# Patient Record
Sex: FEMALE | Race: WHITE | Employment: UNEMPLOYED | URBAN - METROPOLITAN AREA
[De-identification: names, ages, dates, MRNs, and addresses within clinical notes are randomized per-mention and may not be internally consistent; named-entity substitution may affect disease eponyms.]

---

## 2017-06-12 ENCOUNTER — OFFICE VISIT (OUTPATIENT)
Dept: INTERNAL MEDICINE | Age: 22
End: 2017-06-12

## 2017-06-12 VITALS
DIASTOLIC BLOOD PRESSURE: 84 MMHG | SYSTOLIC BLOOD PRESSURE: 124 MMHG | HEIGHT: 65 IN | HEART RATE: 83 BPM | WEIGHT: 256 LBS | OXYGEN SATURATION: 99 % | TEMPERATURE: 98 F | BODY MASS INDEX: 42.65 KG/M2

## 2017-06-12 DIAGNOSIS — Z11.4 SCREENING FOR HIV WITHOUT PRESENCE OF RISK FACTORS: ICD-10-CM

## 2017-06-12 DIAGNOSIS — L40.9 PSORIASIS: ICD-10-CM

## 2017-06-12 DIAGNOSIS — Z00.00 ROUTINE GENERAL MEDICAL EXAMINATION AT A HEALTH CARE FACILITY: ICD-10-CM

## 2017-06-12 DIAGNOSIS — Z00.00 ROUTINE GENERAL MEDICAL EXAMINATION AT A HEALTH CARE FACILITY: Primary | ICD-10-CM

## 2017-06-12 DIAGNOSIS — L01.00 IMPETIGO: ICD-10-CM

## 2017-06-12 LAB
A/G RATIO: 1.5 (ref 1.1–2.2)
ALBUMIN SERPL-MCNC: 4 G/DL (ref 3.4–5)
ALP BLD-CCNC: 48 U/L (ref 40–129)
ALT SERPL-CCNC: 8 U/L (ref 10–40)
ANION GAP SERPL CALCULATED.3IONS-SCNC: 14 MMOL/L (ref 3–16)
AST SERPL-CCNC: 8 U/L (ref 15–37)
BILIRUB SERPL-MCNC: 0.3 MG/DL (ref 0–1)
BUN BLDV-MCNC: 13 MG/DL (ref 7–20)
CALCIUM SERPL-MCNC: 9.1 MG/DL (ref 8.3–10.6)
CHLORIDE BLD-SCNC: 104 MMOL/L (ref 99–110)
CHOLESTEROL, TOTAL: 171 MG/DL (ref 0–199)
CO2: 23 MMOL/L (ref 21–32)
CREAT SERPL-MCNC: 0.7 MG/DL (ref 0.6–1.1)
GFR AFRICAN AMERICAN: >60
GFR NON-AFRICAN AMERICAN: >60
GLOBULIN: 2.6 G/DL
GLUCOSE FASTING: 106 MG/DL (ref 70–99)
HCT VFR BLD CALC: 39.5 % (ref 36–48)
HDLC SERPL-MCNC: 55 MG/DL (ref 40–60)
HEMOGLOBIN: 12.7 G/DL (ref 12–16)
HEPATITIS C ANTIBODY INTERPRETATION: NORMAL
LDL CHOLESTEROL CALCULATED: 83 MG/DL
MCH RBC QN AUTO: 28.1 PG (ref 26–34)
MCHC RBC AUTO-ENTMCNC: 32.3 G/DL (ref 31–36)
MCV RBC AUTO: 87.1 FL (ref 80–100)
PDW BLD-RTO: 14.2 % (ref 12.4–15.4)
PLATELET # BLD: 271 K/UL (ref 135–450)
PMV BLD AUTO: 8.7 FL (ref 5–10.5)
POTASSIUM SERPL-SCNC: 4 MMOL/L (ref 3.5–5.1)
RBC # BLD: 4.53 M/UL (ref 4–5.2)
SODIUM BLD-SCNC: 141 MMOL/L (ref 136–145)
T4 FREE: 1.3 NG/DL (ref 0.9–1.8)
TOTAL PROTEIN: 6.6 G/DL (ref 6.4–8.2)
TRIGL SERPL-MCNC: 165 MG/DL (ref 0–150)
TSH REFLEX: 0.99 UIU/ML (ref 0.27–4.2)
VLDLC SERPL CALC-MCNC: 33 MG/DL
WBC # BLD: 7.3 K/UL (ref 4–11)

## 2017-06-12 PROCEDURE — 99202 OFFICE O/P NEW SF 15 MIN: CPT | Performed by: NURSE PRACTITIONER

## 2017-06-12 PROCEDURE — 99385 PREV VISIT NEW AGE 18-39: CPT | Performed by: NURSE PRACTITIONER

## 2017-06-12 RX ORDER — DICLOXACILLIN SODIUM 250 MG/1
250 CAPSULE ORAL 4 TIMES DAILY
Qty: 28 CAPSULE | Refills: 0 | Status: SHIPPED | OUTPATIENT
Start: 2017-06-12 | End: 2017-06-19

## 2017-06-12 RX ORDER — BETAMETHASONE DIPROPIONATE 0.05 %
OINTMENT (GRAM) TOPICAL
Qty: 15 G | Refills: 0 | Status: SHIPPED | OUTPATIENT
Start: 2017-06-12 | End: 2018-06-04 | Stop reason: ALTCHOICE

## 2017-06-12 ASSESSMENT — ENCOUNTER SYMPTOMS
ABDOMINAL PAIN: 0
SHORTNESS OF BREATH: 0
BLOOD IN STOOL: 0
HEARTBURN: 0
SPUTUM PRODUCTION: 0
WHEEZING: 0
EYE PAIN: 0
VOMITING: 0
COUGH: 0
HEMOPTYSIS: 0
PHOTOPHOBIA: 0
BACK PAIN: 0
EYE DISCHARGE: 0
DIARRHEA: 0
EYE REDNESS: 0
SORE THROAT: 0
CONSTIPATION: 0
BLURRED VISION: 0
STRIDOR: 0
NAUSEA: 0
DOUBLE VISION: 0
ORTHOPNEA: 0

## 2017-06-13 DIAGNOSIS — Z00.00 ROUTINE GENERAL MEDICAL EXAMINATION AT A HEALTH CARE FACILITY: Primary | ICD-10-CM

## 2017-06-13 DIAGNOSIS — R73.9 ELEVATED BLOOD SUGAR: ICD-10-CM

## 2017-06-13 LAB
ESTIMATED AVERAGE GLUCOSE: 99.7 MG/DL
HBA1C MFR BLD: 5.1 %
HIV-1 AND HIV-2 ANTIBODIES: NORMAL

## 2017-08-18 ENCOUNTER — OFFICE VISIT (OUTPATIENT)
Dept: INTERNAL MEDICINE | Age: 22
End: 2017-08-18

## 2017-08-18 VITALS
HEART RATE: 94 BPM | DIASTOLIC BLOOD PRESSURE: 88 MMHG | WEIGHT: 252 LBS | OXYGEN SATURATION: 98 % | SYSTOLIC BLOOD PRESSURE: 128 MMHG | BODY MASS INDEX: 41.99 KG/M2 | TEMPERATURE: 97.4 F | HEIGHT: 65 IN

## 2017-08-18 DIAGNOSIS — L30.9 DERMATITIS: Primary | ICD-10-CM

## 2017-08-18 PROCEDURE — 99213 OFFICE O/P EST LOW 20 MIN: CPT | Performed by: NURSE PRACTITIONER

## 2017-08-18 RX ORDER — METHYLPREDNISOLONE 4 MG/1
TABLET ORAL
Qty: 1 KIT | Refills: 0 | Status: SHIPPED | OUTPATIENT
Start: 2017-08-18 | End: 2017-08-24

## 2017-08-18 ASSESSMENT — ENCOUNTER SYMPTOMS
COUGH: 0
SHORTNESS OF BREATH: 0

## 2017-08-29 ENCOUNTER — TELEPHONE (OUTPATIENT)
Dept: INTERNAL MEDICINE | Age: 22
End: 2017-08-29

## 2017-08-29 DIAGNOSIS — L30.9 DERMATITIS: Primary | ICD-10-CM

## 2017-08-29 RX ORDER — PREDNISONE 20 MG/1
TABLET ORAL
Qty: 21 TABLET | Refills: 0 | Status: SHIPPED | OUTPATIENT
Start: 2017-08-29 | End: 2018-05-24 | Stop reason: ALTCHOICE

## 2017-09-15 DIAGNOSIS — L30.9 DERMATITIS: ICD-10-CM

## 2017-09-15 RX ORDER — PREDNISONE 20 MG/1
TABLET ORAL
Qty: 21 TABLET | Refills: 0 | OUTPATIENT
Start: 2017-09-15

## 2017-10-30 ENCOUNTER — OFFICE VISIT (OUTPATIENT)
Dept: DERMATOLOGY | Age: 22
End: 2017-10-30

## 2017-10-30 DIAGNOSIS — L30.9 ECZEMA, UNSPECIFIED TYPE: Primary | ICD-10-CM

## 2017-10-30 DIAGNOSIS — Z78.9 NON-TOBACCO USER: ICD-10-CM

## 2017-10-30 PROCEDURE — 99202 OFFICE O/P NEW SF 15 MIN: CPT | Performed by: DERMATOLOGY

## 2017-10-30 NOTE — PROGRESS NOTES
[facility]) Dermatology  [place]luvia [place], [facility] [address]       Rahel Mueller  1995    24 y.o. female     Date of Visit: 10/30/2017    Chief Complaint:   Chief Complaint   Patient presents with    New Patient    Eczema     on face, hands, arms and stomach- was on a steroid now on augmented 0.05%        I was asked to see this patient by Dr. Monserrat Sanders. History of Present Illness:  Rahel Mueller is a 24 y.o. female who presents with the chief complaint of establish care and itchy rash located on hands/arms, stomach, and face. She has had intermittent flares of eczema for several months. She was prescribed topical betamethasone 0.05% ointment and has used inconsistently for weeks. She was given short course of oral prednisone taper that did help to resolve rash but came back once completed course of prednisone. She states her face is mostly clear today. Review of Systems:  Constitutional: Reports general sense of well-being   Skin: No new or changing moles, no history of keloids or hypertrophic scars. Heme: No abnormal bruising or bleeding. Past Medical History, Family History, Surgical History, Medications and Allergies reviewed. Past Skin Hx:  Hx of impetigo. PMHx: denies hx of childhood eczema, seasonal allergies, or asthma      Family History   Problem Relation Age of Onset    Diabetes Father      Past Medical History:   Diagnosis Date    Impetigo      Past Surgical History:   Procedure Laterality Date    DILATION AND CURETTAGE OF UTERUS      DILATION AND CURETTAGE OF UTERUS  2012       No Known Allergies  Outpatient Prescriptions Marked as Taking for the 10/30/17 encounter (Office Visit) with Lluvia Phoenix, DO   Medication Sig Dispense Refill    betamethasone dipropionate (DIPROLENE) 0.05 % ointment Apply topically daily.  15 g 0       Social History:   Social History     Social History    Marital status: Single     Spouse name: N/A    Number of children:

## 2018-05-08 ENCOUNTER — TELEPHONE (OUTPATIENT)
Dept: DERMATOLOGY | Age: 23
End: 2018-05-08

## 2018-05-25 ENCOUNTER — TELEPHONE (OUTPATIENT)
Dept: DERMATOLOGY | Age: 23
End: 2018-05-25

## 2018-06-04 ENCOUNTER — OFFICE VISIT (OUTPATIENT)
Dept: DERMATOLOGY | Age: 23
End: 2018-06-04

## 2018-06-04 DIAGNOSIS — L30.9 LIP-LICKING ECZEMA: ICD-10-CM

## 2018-06-04 DIAGNOSIS — L30.8 OTHER ECZEMA: Primary | ICD-10-CM

## 2018-06-04 PROCEDURE — 1036F TOBACCO NON-USER: CPT | Performed by: DERMATOLOGY

## 2018-06-04 PROCEDURE — G8427 DOCREV CUR MEDS BY ELIG CLIN: HCPCS | Performed by: DERMATOLOGY

## 2018-06-04 PROCEDURE — G8417 CALC BMI ABV UP PARAM F/U: HCPCS | Performed by: DERMATOLOGY

## 2018-06-04 PROCEDURE — 99213 OFFICE O/P EST LOW 20 MIN: CPT | Performed by: DERMATOLOGY

## 2018-06-04 RX ORDER — ACETAMINOPHEN 160 MG
TABLET,DISINTEGRATING ORAL
Status: ON HOLD | COMMUNITY
Start: 2018-06-03 | End: 2020-09-24

## 2018-06-04 RX ORDER — DESONIDE 0.5 MG/G
OINTMENT TOPICAL
Qty: 60 G | Refills: 0 | Status: ON HOLD | OUTPATIENT
Start: 2018-06-04 | End: 2020-08-07

## 2018-06-04 RX ORDER — FEXOFENADINE HCL 180 MG/1
180 TABLET ORAL
COMMUNITY
Start: 2018-05-08 | End: 2018-08-06

## 2018-06-04 RX ORDER — MOMETASONE FUROATE 1 MG/G
OINTMENT TOPICAL
Refills: 0 | Status: ON HOLD | COMMUNITY
Start: 2018-05-08 | End: 2020-08-07

## 2018-06-18 ENCOUNTER — OFFICE VISIT (OUTPATIENT)
Dept: DERMATOLOGY | Age: 23
End: 2018-06-18

## 2018-06-18 DIAGNOSIS — H01.119 EYELID DERMATITIS, ALLERGIC/CONTACT: Primary | ICD-10-CM

## 2018-06-18 DIAGNOSIS — L30.9 LIP-LICKING ECZEMA: ICD-10-CM

## 2018-06-18 PROCEDURE — 1036F TOBACCO NON-USER: CPT | Performed by: DERMATOLOGY

## 2018-06-18 PROCEDURE — 99213 OFFICE O/P EST LOW 20 MIN: CPT | Performed by: DERMATOLOGY

## 2018-06-18 PROCEDURE — G8417 CALC BMI ABV UP PARAM F/U: HCPCS | Performed by: DERMATOLOGY

## 2018-06-18 PROCEDURE — G8427 DOCREV CUR MEDS BY ELIG CLIN: HCPCS | Performed by: DERMATOLOGY

## 2018-06-18 RX ORDER — TACROLIMUS 1 MG/G
OINTMENT TOPICAL
Qty: 30 G | Refills: 1 | Status: ON HOLD | OUTPATIENT
Start: 2018-06-18 | End: 2020-08-07

## 2020-03-04 LAB
C. TRACHOMATIS, EXTERNAL RESULT: NEGATIVE
N. GONORRHOEAE, EXTERNAL RESULT: NEGATIVE

## 2020-03-16 LAB
ABO, EXTERNAL RESULT: NORMAL
HEP B, EXTERNAL RESULT: NEGATIVE
HIV, EXTERNAL RESULT: NORMAL
RH FACTOR, EXTERNAL RESULT: POSITIVE
RPR, EXTERNAL RESULT: NORMAL
RUBELLA TITER, EXTERNAL RESULT: NORMAL

## 2020-08-07 ENCOUNTER — HOSPITAL ENCOUNTER (OUTPATIENT)
Age: 25
Discharge: HOME HEALTH | End: 2020-08-07
Attending: OBSTETRICS & GYNECOLOGY | Admitting: OBSTETRICS & GYNECOLOGY
Payer: COMMERCIAL

## 2020-08-07 VITALS
SYSTOLIC BLOOD PRESSURE: 129 MMHG | WEIGHT: 230 LBS | HEART RATE: 87 BPM | HEIGHT: 65 IN | TEMPERATURE: 98.4 F | BODY MASS INDEX: 38.32 KG/M2 | DIASTOLIC BLOOD PRESSURE: 79 MMHG | RESPIRATION RATE: 16 BRPM

## 2020-08-07 PROCEDURE — 99234 HOSP IP/OBS SM DT SF/LOW 45: CPT | Performed by: OBSTETRICS & GYNECOLOGY

## 2020-08-07 PROCEDURE — 99211 OFF/OP EST MAY X REQ PHY/QHP: CPT

## 2020-08-07 RX ORDER — ONDANSETRON 4 MG/1
4 TABLET, FILM COATED ORAL EVERY 8 HOURS PRN
Status: ON HOLD | COMMUNITY
End: 2020-09-23

## 2020-08-07 ASSESSMENT — ENCOUNTER SYMPTOMS
VOMITING: 0
DIARRHEA: 0
ABDOMINAL DISTENTION: 0
NAUSEA: 0
CONSTIPATION: 0
SHORTNESS OF BREATH: 0
ABDOMINAL PAIN: 0

## 2020-08-08 NOTE — PROGRESS NOTES
Pt declines need for any medications for current headache and is comfortable with plan to discharge with follow up at scheduled appointment on Monday.

## 2020-08-08 NOTE — H&P
Department of Obstetrics and Gynecology  Labor and Delivery  Attending Triage Note      SUBJECTIVE:  20 y/o [id number] female at 34 weeks 4 days gestation with [facility] 10/19/2020 presents to triage for evaluation secondary to persistent headache. Has noted headache throughout the day and became concerned when symptoms did not improve with hydration, Tylenol and caffeine. Admits to occasional flashes in vision but nothing consistent. Denies RUQ pain. Denies vaginal bleeding, loss of fluid, contractions, and decreased fetal movement. Pregnancy is complicated by latex allergy, maternal weight, and history of SAB x 2 with D&C. Review of Systems   Constitutional: Negative for activity change, appetite change, chills, fatigue, fever and unexpected weight change. Eyes: Negative for visual disturbance. Respiratory: Negative for shortness of breath. Cardiovascular: Negative for chest pain. Gastrointestinal: Negative for abdominal distention, abdominal pain, constipation, diarrhea, nausea and vomiting. Genitourinary: Negative for difficulty urinating, dysuria, hematuria, pelvic pain, vaginal bleeding, vaginal discharge and vaginal pain. Neurological: Positive for headaches. Allergies   Allergen Reactions    Latex Rash    Lactose Intolerance (Gi) Diarrhea     . No current facility-administered medications on file prior to encounter.       Current Outpatient Medications on File Prior to Encounter   Medication Sig Dispense Refill    Prenatal MV-Min-Fe Fum-FA-DHA (PRENATAL 1 PO) Take by mouth      ondansetron (ZOFRAN) 4 MG tablet Take 4 mg by mouth every 8 hours as needed for Nausea or Vomiting      Cholecalciferol (VITAMIN D3) 2000 units CAPS        Past Medical History:   Diagnosis Date    Impetigo      Past Surgical History:   Procedure Laterality Date    DILATION AND CURETTAGE OF UTERUS      DILATION AND CURETTAGE OF UTERUS  2012     Social History     Socioeconomic History    Marital status: Single     Spouse name: Not on file    Number of children: Not on file    Years of education: Not on file    Highest education level: Not on file   Occupational History    Not on file   Social Needs    Financial resource strain: Not on file    Food insecurity     Worry: Not on file     Inability: Not on file    Transportation needs     Medical: Not on file     Non-medical: Not on file   Tobacco Use    Smoking status: Never Smoker    Smokeless tobacco: Never Used   Substance and Sexual Activity    Alcohol use: Yes     Comment: occasionally    Drug use: No    Sexual activity: Yes     Partners: Male   Lifestyle    Physical activity     Days per week: Not on file     Minutes per session: Not on file    Stress: Not on file   Relationships    Social connections     Talks on phone: Not on file     Gets together: Not on file     Attends Rastafari service: Not on file     Active member of club or organization: Not on file     Attends meetings of clubs or organizations: Not on file     Relationship status: Not on file    Intimate partner violence     Fear of current or ex partner: Not on file     Emotionally abused: Not on file     Physically abused: Not on file     Forced sexual activity: Not on file   Other Topics Concern    Not on file   Social History Narrative    Not on file     Family History   Problem Relation Age of Onset    Diabetes Father      OB History    Para Term  AB Living   4 1 1   2 1   SAB TAB Ectopic Molar Multiple Live Births   2         1      # Outcome Date GA Lbr Cas/2nd Weight Sex Delivery Anes PTL Lv   4 Current            3 2019           2 Term 11/03/15    M Vag-Spont   [facility]   1 2012               OBJECTIVE    Vitals:  /79   Pulse 87   Temp 98.4 °F (36.9 °C) (Oral)   Resp 16   Ht 5' 5\" (1.651 m)   LMP  (Exact Date)   BMI 39.94 kg/m²   Blood pressures: 120's-130's/70's    CONSTITUTIONAL:  awake, alert, cooperative, no apparent distress, and

## 2020-08-08 NOTE — PROGRESS NOTES
Pt presents to triage with c/o pelvic pain intermittently over past week, and headache starting yesterday 6-7/10. +FM, denies LOF or abnormal vaginal discharge. Pt took tylenol for headache at 1915 with little relief. Placed on EFM at this time.

## 2020-08-08 NOTE — PROGRESS NOTES
Pt verbalized understanding of verbal and written discharge instructions and denies having questions at this time. Pt left OB unit at 2233 ambulatory, undelivered, and in stable condition, accompanied by FOB. Patient is not in active labor.

## 2020-08-08 NOTE — PROGRESS NOTES
Updated Dr. Garfield Serra of [facility] BP checks, c/o headache 6-7/10 not relived by tylenol at [address], no current pelvic pain. If pt desires medication for pain okay to order meds, otherwise okay to discharge pt after evaluation from  and instructions to follow up at regularly scheduled appointment.

## 2020-09-11 ENCOUNTER — HOSPITAL ENCOUNTER (OUTPATIENT)
Age: 25
Discharge: HOME | End: 2020-09-11
Attending: OBSTETRICS & GYNECOLOGY | Admitting: OBSTETRICS & GYNECOLOGY
Payer: COMMERCIAL

## 2020-09-11 VITALS
HEART RATE: 90 BPM | DIASTOLIC BLOOD PRESSURE: 80 MMHG | SYSTOLIC BLOOD PRESSURE: 134 MMHG | WEIGHT: 270 LBS | TEMPERATURE: 99.1 F | HEIGHT: 65 IN | RESPIRATION RATE: 18 BRPM | BODY MASS INDEX: 44.98 KG/M2

## 2020-09-11 PROBLEM — O16.3 HYPERTENSION AFFECTING PREGNANCY IN THIRD TRIMESTER: Status: ACTIVE | Noted: 2020-09-11

## 2020-09-11 PROBLEM — O13.3 GESTATIONAL HYPERTENSION, THIRD TRIMESTER: Status: ACTIVE | Noted: 2020-09-11

## 2020-09-11 LAB
A/G RATIO: 1.1 (ref 1.1–2.2)
ALBUMIN SERPL-MCNC: 3.1 G/DL (ref 3.4–5)
ALP BLD-CCNC: 98 U/L (ref 40–129)
ALT SERPL-CCNC: 7 U/L (ref 10–40)
ANION GAP SERPL CALCULATED.3IONS-SCNC: 12 MMOL/L (ref 3–16)
AST SERPL-CCNC: 10 U/L (ref 15–37)
BILIRUB SERPL-MCNC: <0.2 MG/DL (ref 0–1)
BUN BLDV-MCNC: 9 MG/DL (ref 7–20)
CALCIUM SERPL-MCNC: 9.5 MG/DL (ref 8.3–10.6)
CHLORIDE BLD-SCNC: 105 MMOL/L (ref 99–110)
CO2: 20 MMOL/L (ref 21–32)
CREAT SERPL-MCNC: <0.5 MG/DL (ref 0.6–1.1)
CREATININE URINE: 123.9 MG/DL (ref 28–259)
GFR AFRICAN AMERICAN: >60
GFR NON-AFRICAN AMERICAN: >60
GLOBULIN: 2.8 G/DL
GLUCOSE BLD-MCNC: 96 MG/DL (ref 70–99)
HCT VFR BLD CALC: 33.4 % (ref 36–48)
HEMOGLOBIN: 11.1 G/DL (ref 12–16)
MCH RBC QN AUTO: 28.6 PG (ref 26–34)
MCHC RBC AUTO-ENTMCNC: 33.1 G/DL (ref 31–36)
MCV RBC AUTO: 86.2 FL (ref 80–100)
PDW BLD-RTO: 13.8 % (ref 12.4–15.4)
PLATELET # BLD: 275 K/UL (ref 135–450)
PMV BLD AUTO: 9 FL (ref 5–10.5)
POTASSIUM SERPL-SCNC: 4.2 MMOL/L (ref 3.5–5.1)
PROTEIN PROTEIN: 33 MG/DL
PROTEIN/CREAT RATIO: 0.3 MG/DL
RBC # BLD: 3.87 M/UL (ref 4–5.2)
SODIUM BLD-SCNC: 137 MMOL/L (ref 136–145)
TOTAL PROTEIN: 5.9 G/DL (ref 6.4–8.2)
WBC # BLD: 12 K/UL (ref 4–11)

## 2020-09-11 PROCEDURE — 80053 COMPREHEN METABOLIC PANEL: CPT

## 2020-09-11 PROCEDURE — 82570 ASSAY OF URINE CREATININE: CPT

## 2020-09-11 PROCEDURE — 84156 ASSAY OF PROTEIN URINE: CPT

## 2020-09-11 PROCEDURE — 99211 OFF/OP EST MAY X REQ PHY/QHP: CPT

## 2020-09-11 PROCEDURE — 85027 COMPLETE CBC AUTOMATED: CPT

## 2020-09-11 RX ORDER — ACETAMINOPHEN 325 MG/1
650 TABLET ORAL EVERY 4 HOURS PRN
Status: DISCONTINUED | OUTPATIENT
Start: 2020-09-11 | End: 2020-09-11 | Stop reason: HOSPADM

## 2020-09-11 NOTE — FLOWSHEET NOTE
Pt ambulated from triage undelivered, in good condition and not in active labor. Pt accompanied by her .

## 2020-09-11 NOTE — FLOWSHEET NOTE
Reviewed discharge instructions with pt. Pt verbalized understanding and signed written instructions.

## 2020-09-11 NOTE — H&P
Department of Obstetrics and Gynecology   Obstetrics History and Physical        CHIEF COMPLAINT:  Elevated BP in office. Denies HA, visional changes, or abdominal pain. HISTORY OF PRESENT ILLNESS:      The patient is a 21 y.o. female at 31w1d.   OB History        4    Para   1    Term   1            AB   2    Living   1       SAB   2    TAB        Ectopic        Molar        Multiple        Live Births   1            Patient presents with a chief complaint as above and is being admitted for observation    Estimated Due Date: Estimated Date of Delivery: 10/19/20    PRENATAL CARE:    Complicated by: elevated BMI    PAST OB HISTORY:  OB History        4    Para   1    Term   1            AB   2    Living   1       SAB   2    TAB        Ectopic        Molar        Multiple        Live Births   1                Past Medical History:        Diagnosis Date    Chlamydia     received treatment    Heart abnormality     heart murmur     Hypertension affecting pregnancy in third trimester 2020    Impetigo     Intractable headache     Mental disorder 2008    depression     Past Surgical History:        Procedure Laterality Date    DILATION AND CURETTAGE OF UTERUS      DILATION AND CURETTAGE OF UTERUS       Allergies:  Latex and Lactose intolerance (gi)    Social History:    Social History     Socioeconomic History    Marital status: Single     Spouse name: Not on file    Number of children: Not on file    Years of education: Not on file    Highest education level: Not on file   Occupational History    Not on file   Social Needs    Financial resource strain: Not on file    Food insecurity     Worry: Not on file     Inability: Not on file    Transportation needs     Medical: Not on file     Non-medical: Not on file   Tobacco Use    Smoking status: Never Smoker    Smokeless tobacco: Never Used   Substance and Sexual Activity    Alcohol use: Not Currently     Comment: occasionally    Drug use: No    Sexual activity: Yes     Partners: Male   Lifestyle    Physical activity     Days per week: Not on file     Minutes per session: Not on file    Stress: Not on file   Relationships    Social connections     Talks on phone: Not on file     Gets together: Not on file     Attends Gnosticism service: Not on file     Active member of club or organization: Not on file     Attends meetings of clubs or organizations: Not on file     Relationship status: Not on file    Intimate partner violence     Fear of current or ex partner: Not on file     Emotionally abused: Not on file     Physically abused: Not on file     Forced sexual activity: Not on file   Other Topics Concern    Not on file   Social History Narrative    Not on file     Family History:       Problem Relation Age of Onset    High Blood Pressure Mother 36    Diabetes Paternal Grandfather 47    High Cholesterol Maternal Grandmother 47     Medications Prior to Admission:  Medications Prior to Admission: Prenatal MV-Min-Fe Fum-FA-DHA (PRENATAL 1 PO), Take by mouth  ondansetron (ZOFRAN) 4 MG tablet, Take 4 mg by mouth every 8 hours as needed for Nausea or Vomiting  Cholecalciferol (VITAMIN D3) 2000 units CAPS,     REVIEW OF SYSTEMS:    wnl    PHYSICAL EXAM:  Vitals:    09/11/20 1333 09/11/20 1348 09/11/20 1403 09/11/20 1418   BP: 124/62 126/67 131/71 135/76   Pulse: 92 95 93 90   Resp:       Temp:       TempSrc:       Weight:       Height:         General appearance:  awake, alert, cooperative, no apparent distress, and appears stated age  Neurologic:  Awake, alert, oriented to name, place and time. Lungs:  No increased work of breathing, good air exchange  Abdomen:  Soft, non tender, gravid, consistent with her gestational age, EFW by Leopold's maneuver was 5#  Fetal heart rate:  Reassuring.     Contraction frequency:  none    Membranes:  Intact    Labs:   CBC with Differential:    Lab Results   Component Value Date    WBC 12.0 09/11/2020    RBC 3.87 09/11/2020    HGB 11.1 09/11/2020    HCT 33.4 09/11/2020     09/11/2020    MCV 86.2 09/11/2020    MCH 28.6 09/11/2020    MCHC 33.1 09/11/2020    RDW 13.8 09/11/2020    SEGSPCT 67.0 07/15/2012    LYMPHOPCT 24.2 07/15/2012    MONOPCT 6.5 07/15/2012    BASOPCT 0.2 07/15/2012    MONOSABS 0.6 07/15/2012    LYMPHSABS 2.4 07/15/2012    EOSABS 0.2 07/15/2012    BASOSABS 0.0 07/15/2012    DIFFTYPE Auto 07/15/2012     CMP:    Lab Results   Component Value Date     09/11/2020    K 4.2 09/11/2020     09/11/2020    CO2 20 09/11/2020    BUN 9 09/11/2020    CREATININE <0.5 09/11/2020    GFRAA >60 09/11/2020    AGRATIO 1.1 09/11/2020    LABGLOM >60 09/11/2020    GLUCOSE 96 09/11/2020    PROT 5.9 09/11/2020    LABALBU 3.1 09/11/2020    CALCIUM 9.5 09/11/2020    BILITOT <0.2 09/11/2020    ALKPHOS 98 09/11/2020    AST 10 09/11/2020    ALT 7 09/11/2020       ASSESSMENT AND PLAN:    No evidence of HTN in pregnancy. 34 plus weeks with nl labs and BP.   NST-R    PIH reviewed  [address] review  RTO 5 days    CChad Davis

## 2020-09-23 ENCOUNTER — HOSPITAL ENCOUNTER (OUTPATIENT)
Age: 25
Discharge: HOME | DRG: 560 | End: 2020-09-23
Attending: OBSTETRICS & GYNECOLOGY | Admitting: OBSTETRICS & GYNECOLOGY
Payer: COMMERCIAL

## 2020-09-23 ENCOUNTER — APPOINTMENT (OUTPATIENT)
Dept: ULTRASOUND IMAGING | Age: 25
DRG: 560 | End: 2020-09-23
Payer: COMMERCIAL

## 2020-09-23 VITALS
SYSTOLIC BLOOD PRESSURE: 145 MMHG | DIASTOLIC BLOOD PRESSURE: 84 MMHG | WEIGHT: 293 LBS | HEIGHT: 65 IN | HEART RATE: 94 BPM | BODY MASS INDEX: 48.82 KG/M2 | RESPIRATION RATE: 18 BRPM

## 2020-09-23 LAB
A/G RATIO: 1.3 (ref 1.1–2.2)
ALBUMIN SERPL-MCNC: 3.2 G/DL (ref 3.4–5)
ALP BLD-CCNC: 119 U/L (ref 40–129)
ALT SERPL-CCNC: 6 U/L (ref 10–40)
ANION GAP SERPL CALCULATED.3IONS-SCNC: 10 MMOL/L (ref 3–16)
AST SERPL-CCNC: 12 U/L (ref 15–37)
BACTERIA: ABNORMAL /HPF
BASOPHILS ABSOLUTE: 0 K/UL (ref 0–0.2)
BASOPHILS RELATIVE PERCENT: 0.2 %
BILIRUB SERPL-MCNC: 0.3 MG/DL (ref 0–1)
BILIRUBIN URINE: NEGATIVE
BLOOD, URINE: NEGATIVE
BUN BLDV-MCNC: 8 MG/DL (ref 7–20)
CALCIUM SERPL-MCNC: 8.6 MG/DL (ref 8.3–10.6)
CHLORIDE BLD-SCNC: 105 MMOL/L (ref 99–110)
CLARITY: ABNORMAL
CO2: 21 MMOL/L (ref 21–32)
COLOR: YELLOW
CREAT SERPL-MCNC: <0.5 MG/DL (ref 0.6–1.1)
CREATININE URINE: 151.4 MG/DL (ref 28–259)
CRYSTALS, UA: ABNORMAL /HPF
EOSINOPHILS ABSOLUTE: 0.1 K/UL (ref 0–0.6)
EOSINOPHILS RELATIVE PERCENT: 0.5 %
EPITHELIAL CELLS, UA: ABNORMAL /HPF (ref 0–5)
GFR AFRICAN AMERICAN: >60
GFR NON-AFRICAN AMERICAN: >60
GLOBULIN: 2.4 G/DL
GLUCOSE BLD-MCNC: 110 MG/DL (ref 70–99)
GLUCOSE URINE: NEGATIVE MG/DL
HCT VFR BLD CALC: 33 % (ref 36–48)
HEMOGLOBIN: 11 G/DL (ref 12–16)
KETONES, URINE: NEGATIVE MG/DL
LEUKOCYTE ESTERASE, URINE: ABNORMAL
LYMPHOCYTES ABSOLUTE: 2 K/UL (ref 1–5.1)
LYMPHOCYTES RELATIVE PERCENT: 20.5 %
MCH RBC QN AUTO: 28.3 PG (ref 26–34)
MCHC RBC AUTO-ENTMCNC: 33.3 G/DL (ref 31–36)
MCV RBC AUTO: 84.9 FL (ref 80–100)
MICROSCOPIC EXAMINATION: YES
MONOCYTES ABSOLUTE: 0.6 K/UL (ref 0–1.3)
MONOCYTES RELATIVE PERCENT: 5.7 %
MUCUS: ABNORMAL /LPF
NEUTROPHILS ABSOLUTE: 7.1 K/UL (ref 1.7–7.7)
NEUTROPHILS RELATIVE PERCENT: 73.1 %
NITRITE, URINE: NEGATIVE
PDW BLD-RTO: 14.2 % (ref 12.4–15.4)
PH UA: 6 (ref 5–8)
PLATELET # BLD: 251 K/UL (ref 135–450)
PMV BLD AUTO: 9 FL (ref 5–10.5)
POTASSIUM SERPL-SCNC: 3.8 MMOL/L (ref 3.5–5.1)
PROTEIN PROTEIN: 69 MG/DL
PROTEIN UA: 30 MG/DL
PROTEIN/CREAT RATIO: 0.5 MG/DL
RBC # BLD: 3.88 M/UL (ref 4–5.2)
RBC UA: ABNORMAL /HPF (ref 0–4)
SODIUM BLD-SCNC: 136 MMOL/L (ref 136–145)
SPECIFIC GRAVITY UA: 1.02 (ref 1–1.03)
TOTAL PROTEIN: 5.6 G/DL (ref 6.4–8.2)
URIC ACID, SERUM: 2 MG/DL (ref 2.6–6)
URINE TYPE: ABNORMAL
UROBILINOGEN, URINE: 0.2 E.U./DL
WBC # BLD: 9.8 K/UL (ref 4–11)
WBC UA: ABNORMAL /HPF (ref 0–5)

## 2020-09-23 PROCEDURE — 82570 ASSAY OF URINE CREATININE: CPT

## 2020-09-23 PROCEDURE — 99211 OFF/OP EST MAY X REQ PHY/QHP: CPT

## 2020-09-23 PROCEDURE — 84550 ASSAY OF BLOOD/URIC ACID: CPT

## 2020-09-23 PROCEDURE — 36415 COLL VENOUS BLD VENIPUNCTURE: CPT

## 2020-09-23 PROCEDURE — 80053 COMPREHEN METABOLIC PANEL: CPT

## 2020-09-23 PROCEDURE — 76819 FETAL BIOPHYS PROFIL W/O NST: CPT

## 2020-09-23 PROCEDURE — 84156 ASSAY OF PROTEIN URINE: CPT

## 2020-09-23 PROCEDURE — 85025 COMPLETE CBC W/AUTO DIFF WBC: CPT

## 2020-09-23 PROCEDURE — 81001 URINALYSIS AUTO W/SCOPE: CPT

## 2020-09-23 RX ORDER — ONDANSETRON HYDROCHLORIDE 8 MG/1
1 TABLET, FILM COATED ORAL EVERY 6 HOURS PRN
Status: ON HOLD | COMMUNITY
Start: 2020-08-03 | End: 2020-09-28 | Stop reason: HOSPADM

## 2020-09-23 NOTE — FLOWSHEET NOTE
Dr. Ema Newberry at bedside to evaluate patient. Orders for BPP. Schedule patient for induction on Monday. If BPP is 8/8, patient may be discharged to home.

## 2020-09-23 NOTE — H&P
[facility] Delivery Triage Note        CHIEF COMPLAINT:  elevated blood pressure    HISTORY OF PRESENT ILLNESS:      The patient is a 21 y.o. female 37w1d. OB History        4    Para   1    Term   1            AB   2    Living   1       SAB   2    TAB        Ectopic        Molar        Multiple        Live Births   1              Pt is a 23yo [id number] at 36wk2d sent over to triage from office visit / elevated BP. /98 in ofice, (+) 1 protein on urine dip. On  had /92 and was sent to triage at that time for evaluation for preclampsia. Otherwise all BPs at office visits this pregnancy have been normal.     On  seen in triage , labs were unremarkable for preE at that time and PCR 0.266    Today denies HA/changes in vision RUQ pain     Thought may be leaking and was pooling and fern neg in office per Dr. Denilson Jaime. Cervix closed/thick/high in office today    Estimated Due Date:  Estimated Date of Delivery: 10/19/20    PAST MEDICAL HISTORY:   Past Medical History:   Diagnosis Date    Chlamydia 2017    received treatment    Heart abnormality     heart murmur     Hypertension affecting pregnancy in third trimester 2020    Impetigo     Intractable headache     Mental disorder 2008    depression       PAST  SURGICAL HISTORY:   Past Surgical History:   Procedure Laterality Date    DILATION AND CURETTAGE OF UTERUS      DILATION AND CURETTAGE OF UTERUS         SOCIAL HISTORY:     reports that she has never smoked. She has never used smokeless tobacco. She reports previous alcohol use. She reports that she does not use drugs. MEDICATIONS:    Prior to Admission medications    Medication Sig Start Date End Date Taking?  Authorizing Provider   ondansetron (ZOFRAN) 8 MG tablet Take 1 tablet by mouth every 6 hours as needed 8/3/20  Yes Historical Provider, MD   Prenatal MV-Min-Fe Fum-FA-DHA (PRENATAL 1 PO) Take by mouth   Yes Historical Provider, MD Cholecalciferol (VITAMIN D3) 2000 units CAPS  6/3/18   Historical Provider, MD          REVIEW OF SYSTEMS:     See HPI     PHYSICAL EXAM:    Vital Signs: Blood pressure 121/70, pulse 92, resp. rate 18, height 5' 5\" (1.651 m), weight 300 lb (136.1 kg), not currently breastfeeding. GEN: NAD  ABD: soft/ NTTP/ gravid  EXT: nontender    Fetal heart rate:  135/mod melani/ (+) accels   Cervix:  Closed in office today   Contraction frequency:  irregular    Membranes:  Intact- fern neg in office today     General Labs:      CBC:   Lab Results   Component Value Date    WBC 9.8 09/23/2020    RBC 3.88 09/23/2020    HGB 11.0 09/23/2020    HCT 33.0 09/23/2020    MCV 84.9 09/23/2020    MCH 28.3 09/23/2020    MCHC 33.3 09/23/2020    RDW 14.2 09/23/2020     09/23/2020    MPV 9.0 09/23/2020     CMP:    Lab Results   Component Value Date     09/23/2020    K 3.8 09/23/2020     09/23/2020    CO2 21 09/23/2020    BUN 8 09/23/2020    CREATININE <0.5 09/23/2020    GFRAA >60 09/23/2020    AGRATIO 1.3 09/23/2020    LABGLOM >60 09/23/2020    GLUCOSE 110 09/23/2020    PROT 5.6 09/23/2020    LABALBU 3.2 09/23/2020    CALCIUM 8.6 09/23/2020    BILITOT 0.3 09/23/2020    ALKPHOS 119 09/23/2020    AST 12 09/23/2020    ALT 6 09/23/2020     Uric Acid:    Lab Results   Component Value Date    LABURIC 2.0 09/23/2020   PCR 0.5     ASSESSMENT/PLAN:    36w2d preeclampsia without severe features   - preeclampsia without severe features - BPs mostly wnl in triage, one mild range, did have mild range X2 in office and PCR 0.5, diagnostic of preE without severe features. CBC/CMP unremarkable. NST reactive. Last growth sono 2weeks ago in office. Plan BPP today.    Plan IOL 37 weeks- will schedule for Monday pm       King Begum  9/23/2020

## 2020-09-23 NOTE — FLOWSHEET NOTE
Pt verbalized understanding of verbal and written discharge instructions and denies having questions at this time. Pt able to ambulate off unit, undelivered, in stable condition.

## 2020-09-23 NOTE — FLOWSHEET NOTE
Spoke with Dr. Maribel Demarco at admission. Orders for labs and serial blood pressures. Will continue to monitor.

## 2020-09-24 ENCOUNTER — HOSPITAL ENCOUNTER (INPATIENT)
Age: 25
LOS: 4 days | Discharge: HOME | DRG: 560 | End: 2020-09-28
Attending: OBSTETRICS & GYNECOLOGY | Admitting: OBSTETRICS & GYNECOLOGY
Payer: COMMERCIAL

## 2020-09-24 PROBLEM — O14.93 PRE-ECLAMPSIA IN THIRD TRIMESTER: Status: ACTIVE | Noted: 2020-09-24

## 2020-09-24 LAB
A/G RATIO: 1.3 (ref 1.1–2.2)
ALBUMIN SERPL-MCNC: 3.5 G/DL (ref 3.4–5)
ALP BLD-CCNC: 126 U/L (ref 40–129)
ALT SERPL-CCNC: 8 U/L (ref 10–40)
AMPHETAMINE SCREEN, URINE: NORMAL
ANION GAP SERPL CALCULATED.3IONS-SCNC: 13 MMOL/L (ref 3–16)
APTT: 26.1 SEC (ref 24.2–36.2)
AST SERPL-CCNC: 11 U/L (ref 15–37)
BACTERIA: ABNORMAL /HPF
BARBITURATE SCREEN URINE: NORMAL
BASOPHILS ABSOLUTE: 0.1 K/UL (ref 0–0.2)
BASOPHILS RELATIVE PERCENT: 0.9 %
BENZODIAZEPINE SCREEN, URINE: NORMAL
BILIRUB SERPL-MCNC: 0.3 MG/DL (ref 0–1)
BILIRUBIN URINE: NEGATIVE
BLOOD, URINE: NEGATIVE
BUN BLDV-MCNC: 8 MG/DL (ref 7–20)
BUPRENORPHINE URINE: NORMAL
CALCIUM SERPL-MCNC: 10.1 MG/DL (ref 8.3–10.6)
CANNABINOID SCREEN URINE: NORMAL
CHLORIDE BLD-SCNC: 105 MMOL/L (ref 99–110)
CLARITY: CLEAR
CO2: 20 MMOL/L (ref 21–32)
COCAINE METABOLITE SCREEN URINE: NORMAL
COLOR: YELLOW
CREAT SERPL-MCNC: <0.5 MG/DL (ref 0.6–1.1)
CREATININE URINE: 108.3 MG/DL (ref 28–259)
CRYSTALS, UA: ABNORMAL /HPF
EOSINOPHILS ABSOLUTE: 0 K/UL (ref 0–0.6)
EOSINOPHILS RELATIVE PERCENT: 0.4 %
EPITHELIAL CELLS, UA: ABNORMAL /HPF (ref 0–5)
FIBRINOGEN: 451 MG/DL (ref 200–397)
GFR AFRICAN AMERICAN: >60
GFR NON-AFRICAN AMERICAN: >60
GLOBULIN: 2.8 G/DL
GLUCOSE BLD-MCNC: 86 MG/DL (ref 70–99)
GLUCOSE URINE: NEGATIVE MG/DL
HCT VFR BLD CALC: 35.2 % (ref 36–48)
HEMOGLOBIN: 11.8 G/DL (ref 12–16)
INR BLD: 0.91 (ref 0.86–1.14)
KETONES, URINE: NEGATIVE MG/DL
LEUKOCYTE ESTERASE, URINE: ABNORMAL
LYMPHOCYTES ABSOLUTE: 3.3 K/UL (ref 1–5.1)
LYMPHOCYTES RELATIVE PERCENT: 26 %
Lab: NORMAL
MCH RBC QN AUTO: 28.6 PG (ref 26–34)
MCHC RBC AUTO-ENTMCNC: 33.4 G/DL (ref 31–36)
MCV RBC AUTO: 85.5 FL (ref 80–100)
METHADONE SCREEN, URINE: NORMAL
MICROSCOPIC EXAMINATION: YES
MONOCYTES ABSOLUTE: 0.7 K/UL (ref 0–1.3)
MONOCYTES RELATIVE PERCENT: 5.3 %
MUCUS: ABNORMAL /LPF
NEUTROPHILS ABSOLUTE: 8.5 K/UL (ref 1.7–7.7)
NEUTROPHILS RELATIVE PERCENT: 67.4 %
NITRITE, URINE: NEGATIVE
OPIATE SCREEN URINE: NORMAL
OXYCODONE URINE: NORMAL
PDW BLD-RTO: 14.1 % (ref 12.4–15.4)
PH UA: 6.5
PH UA: 6.5 (ref 5–8)
PHENCYCLIDINE SCREEN URINE: NORMAL
PLATELET # BLD: 275 K/UL (ref 135–450)
PMV BLD AUTO: 9.1 FL (ref 5–10.5)
POTASSIUM SERPL-SCNC: 3.8 MMOL/L (ref 3.5–5.1)
PROPOXYPHENE SCREEN: NORMAL
PROTEIN PROTEIN: 31 MG/DL
PROTEIN UA: ABNORMAL MG/DL
PROTEIN/CREAT RATIO: 0.3 MG/DL
PROTHROMBIN TIME: 10.5 SEC (ref 10–13.2)
RBC # BLD: 4.12 M/UL (ref 4–5.2)
RBC UA: ABNORMAL /HPF (ref 0–4)
SARS-COV-2, NAAT: NOT DETECTED
SODIUM BLD-SCNC: 138 MMOL/L (ref 136–145)
SPECIFIC GRAVITY UA: 1.02 (ref 1–1.03)
SPECIMEN STATUS: NORMAL
TOTAL PROTEIN: 6.3 G/DL (ref 6.4–8.2)
URIC ACID, SERUM: 2.1 MG/DL (ref 2.6–6)
URINE TYPE: ABNORMAL
UROBILINOGEN, URINE: 0.2 E.U./DL
WBC # BLD: 12.6 K/UL (ref 4–11)
WBC UA: ABNORMAL /HPF (ref 0–5)
YEAST: PRESENT /HPF

## 2020-09-24 PROCEDURE — 85025 COMPLETE CBC W/AUTO DIFF WBC: CPT

## 2020-09-24 PROCEDURE — 85384 FIBRINOGEN ACTIVITY: CPT

## 2020-09-24 PROCEDURE — U0002 COVID-19 LAB TEST NON-CDC: HCPCS

## 2020-09-24 PROCEDURE — 85730 THROMBOPLASTIN TIME PARTIAL: CPT

## 2020-09-24 PROCEDURE — 1220000000 HC SEMI PRIVATE OB R&B

## 2020-09-24 PROCEDURE — 6370000000 HC RX 637 (ALT 250 FOR IP): Performed by: OBSTETRICS & GYNECOLOGY

## 2020-09-24 PROCEDURE — 80053 COMPREHEN METABOLIC PANEL: CPT

## 2020-09-24 PROCEDURE — 80307 DRUG TEST PRSMV CHEM ANLYZR: CPT

## 2020-09-24 PROCEDURE — 3E0P7VZ INTRODUCTION OF HORMONE INTO FEMALE REPRODUCTIVE, VIA NATURAL OR ARTIFICIAL OPENING: ICD-10-PCS | Performed by: OBSTETRICS & GYNECOLOGY

## 2020-09-24 PROCEDURE — 82570 ASSAY OF URINE CREATININE: CPT

## 2020-09-24 PROCEDURE — 84156 ASSAY OF PROTEIN URINE: CPT

## 2020-09-24 PROCEDURE — 81001 URINALYSIS AUTO W/SCOPE: CPT

## 2020-09-24 PROCEDURE — 84550 ASSAY OF BLOOD/URIC ACID: CPT

## 2020-09-24 PROCEDURE — 85610 PROTHROMBIN TIME: CPT

## 2020-09-24 PROCEDURE — 2580000003 HC RX 258: Performed by: OBSTETRICS & GYNECOLOGY

## 2020-09-24 RX ORDER — SODIUM CHLORIDE, SODIUM LACTATE, POTASSIUM CHLORIDE, CALCIUM CHLORIDE 600; 310; 30; 20 MG/100ML; MG/100ML; MG/100ML; MG/100ML
INJECTION, SOLUTION INTRAVENOUS CONTINUOUS
Status: DISCONTINUED | OUTPATIENT
Start: 2020-09-24 | End: 2020-09-26

## 2020-09-24 RX ORDER — ONDANSETRON 2 MG/ML
4 INJECTION INTRAMUSCULAR; INTRAVENOUS EVERY 6 HOURS PRN
Status: DISCONTINUED | OUTPATIENT
Start: 2020-09-24 | End: 2020-09-26

## 2020-09-24 RX ORDER — LIDOCAINE HYDROCHLORIDE 10 MG/ML
30 INJECTION, SOLUTION EPIDURAL; INFILTRATION; INTRACAUDAL; PERINEURAL PRN
Status: DISCONTINUED | OUTPATIENT
Start: 2020-09-24 | End: 2020-09-26

## 2020-09-24 RX ORDER — DIPHENHYDRAMINE HYDROCHLORIDE 50 MG/ML
25 INJECTION INTRAMUSCULAR; INTRAVENOUS EVERY 4 HOURS PRN
Status: DISCONTINUED | OUTPATIENT
Start: 2020-09-24 | End: 2020-09-26

## 2020-09-24 RX ORDER — ACETAMINOPHEN 500 MG
1000 TABLET ORAL EVERY 6 HOURS PRN
COMMUNITY

## 2020-09-24 RX ORDER — SODIUM CHLORIDE 0.9 % (FLUSH) 0.9 %
10 SYRINGE (ML) INJECTION PRN
Status: DISCONTINUED | OUTPATIENT
Start: 2020-09-24 | End: 2020-09-26

## 2020-09-24 RX ORDER — CALCIUM CARBONATE 200(500)MG
4 TABLET,CHEWABLE ORAL PRN
COMMUNITY

## 2020-09-24 RX ORDER — ACETAMINOPHEN 325 MG/1
650 TABLET ORAL EVERY 4 HOURS PRN
Status: DISCONTINUED | OUTPATIENT
Start: 2020-09-24 | End: 2020-09-26

## 2020-09-24 RX ORDER — SODIUM CHLORIDE 0.9 % (FLUSH) 0.9 %
10 SYRINGE (ML) INJECTION EVERY 12 HOURS SCHEDULED
Status: DISCONTINUED | OUTPATIENT
Start: 2020-09-24 | End: 2020-09-26

## 2020-09-24 RX ORDER — LIDOCAINE HYDROCHLORIDE 10 MG/ML
2 INJECTION, SOLUTION EPIDURAL; INFILTRATION; INTRACAUDAL; PERINEURAL ONCE
Status: DISCONTINUED | OUTPATIENT
Start: 2020-09-24 | End: 2020-09-26

## 2020-09-24 RX ADMIN — ACETAMINOPHEN 650 MG: 325 TABLET ORAL at 21:38

## 2020-09-24 RX ADMIN — SODIUM CHLORIDE, POTASSIUM CHLORIDE, SODIUM LACTATE AND CALCIUM CHLORIDE: 600; 310; 30; 20 INJECTION, SOLUTION INTRAVENOUS at 21:20

## 2020-09-24 RX ADMIN — DINOPROSTONE 10 MG: 10 INSERT VAGINAL at 22:42

## 2020-09-24 ASSESSMENT — PAIN SCALES - GENERAL: PAINLEVEL_OUTOF10: 7

## 2020-09-24 NOTE — FLOWSHEET NOTE
Pt presents with c/o HA and floaters. PIH panel obtained and serial BPs obtained,. Dr Mendez Forget aware of pt presence and pending values. Giovanni Fontana'

## 2020-09-25 PROCEDURE — 6360000002 HC RX W HCPCS: Performed by: OBSTETRICS & GYNECOLOGY

## 2020-09-25 PROCEDURE — 1220000000 HC SEMI PRIVATE OB R&B

## 2020-09-25 PROCEDURE — 10907ZC DRAINAGE OF AMNIOTIC FLUID, THERAPEUTIC FROM PRODUCTS OF CONCEPTION, VIA NATURAL OR ARTIFICIAL OPENING: ICD-10-PCS | Performed by: OBSTETRICS & GYNECOLOGY

## 2020-09-25 PROCEDURE — 6370000000 HC RX 637 (ALT 250 FOR IP): Performed by: OBSTETRICS & GYNECOLOGY

## 2020-09-25 PROCEDURE — 2580000003 HC RX 258: Performed by: OBSTETRICS & GYNECOLOGY

## 2020-09-25 RX ADMIN — Medication 2.5 MILLION UNITS: at 20:40

## 2020-09-25 RX ADMIN — Medication 25 MCG: at 12:25

## 2020-09-25 RX ADMIN — SODIUM CHLORIDE, POTASSIUM CHLORIDE, SODIUM LACTATE AND CALCIUM CHLORIDE: 600; 310; 30; 20 INJECTION, SOLUTION INTRAVENOUS at 21:57

## 2020-09-25 RX ADMIN — DEXTROSE MONOHYDRATE 5 MILLION UNITS: 5 INJECTION INTRAVENOUS at 16:37

## 2020-09-25 RX ADMIN — Medication 1 MILLI-UNITS/MIN: at 21:16

## 2020-09-25 RX ADMIN — Medication 25 MCG: at 16:33

## 2020-09-25 RX ADMIN — DIPHENHYDRAMINE HYDROCHLORIDE 25 MG: 50 INJECTION, SOLUTION INTRAMUSCULAR; INTRAVENOUS at 01:18

## 2020-09-25 RX ADMIN — SODIUM CHLORIDE, POTASSIUM CHLORIDE, SODIUM LACTATE AND CALCIUM CHLORIDE: 600; 310; 30; 20 INJECTION, SOLUTION INTRAVENOUS at 08:48

## 2020-09-25 NOTE — PLAN OF CARE
Problem: Pain:  Goal: Pain level will decrease  Description: Pain level will decrease  9/25/2020 0604 by Dameon Ferro RN  Outcome: Ongoing  9/25/2020 0549 by Arben Woodard RN  Outcome: Ongoing  9/24/2020 2346 by Robin Ortega RN  Outcome: Ongoing  Goal: Control of acute pain  Description: Control of acute pain  9/25/2020 0604 by Dameon Ferro RN  Outcome: Ongoing  9/25/2020 0549 by Arben Woodard RN  Outcome: Ongoing  9/24/2020 2346 by Robin Ortega RN  Outcome: Ongoing  Goal: Control of chronic pain  Description: Control of chronic pain  9/25/2020 0604 by Dameon Ferro RN  Outcome: Ongoing  9/25/2020 0549 by Arben Woodard RN  Outcome: Ongoing  9/24/2020 2346 by Robin Ortega RN  Outcome: Ongoing     Problem: Anxiety:  Goal: Level of anxiety will decrease  Description: Level of anxiety will decrease  9/25/2020 0604 by Dameon Ferro RN  Outcome: Ongoing  9/25/2020 0549 by Arebn Woodard RN  Outcome: Ongoing  9/24/2020 2346 by Robin Ortega RN  Outcome: Ongoing     Problem: Breathing Pattern - Ineffective:  Goal: Able to breathe comfortably  Description: Able to breathe comfortably  9/25/2020 0604 by Dameon Ferro RN  Outcome: Ongoing  9/25/2020 0549 by Arben Woodard RN  Outcome: Ongoing  9/24/2020 2346 by Robin Ortega RN  Outcome: Ongoing     Problem: Fluid Volume - Imbalance:  Goal: Absence of imbalanced fluid volume signs and symptoms  Description: Absence of imbalanced fluid volume signs and symptoms  9/25/2020 0604 by Dameon Ferro RN  Outcome: Ongoing  9/25/2020 0549 by Arben Woodard RN  Outcome: Ongoing  9/24/2020 2346 by Robin Ortega RN  Outcome: Ongoing  Goal: Absence of intrapartum hemorrhage signs and symptoms  Description: Absence of intrapartum hemorrhage signs and symptoms  9/25/2020 0604 by Dameon Ferro RN  Outcome: Ongoing  9/25/2020 0549 by Arben Woodard RN  Outcome: Ongoing  9/24/2020 2346 by Robin Ortega RN  Outcome: Ongoing     Problem: Infection - Intrapartum Infection:  Goal: Will show no infection signs and symptoms  Description: Will show no infection signs and symptoms  2020 by Celia Bermudez RN  Outcome: Ongoing  2020 0549 by Attila Gomez RN  Outcome: Ongoing  2020 by Eze Peres RN  Outcome: Ongoing     Problem: Labor Process - Prolonged:  Goal: Labor progression, first stage, within specified pattern  Description: Labor progression, first stage, within specified pattern  2020 by Celia Bermudez RN  Outcome: Ongoing  2020 0549 by Attila Gomez RN  Outcome: Ongoing  2020 by Eze Peres RN  Outcome: Ongoing  Goal: Labor progession, second stage, within specified pattern  Description: Labor progession, second stage, within specified pattern  2020 by Celia Bermudez RN  Outcome: Ongoing  202049 by Attila Gomez RN  Outcome: Ongoing  2020 by Eze Peres RN  Outcome: Ongoing  Goal: Uterine contractions within specified parameters  Description: Uterine contractions within specified parameters  2020 by Celia Bermudez RN  Outcome: Ongoing  2020 0549 by Attila Gomez RN  Outcome: Ongoing  2020 by Eze Peres RN  Outcome: Ongoing     Problem:  Screening:  Goal: Ability to make informed decisions regarding treatment has improved  Description: Ability to make informed decisions regarding treatment has improved  2020 by Celia Bermudez RN  Outcome: Ongoing  2020 by Attila Gomez RN  Outcome: Ongoing  2020 by Eze Peres RN  Outcome: Ongoing     Problem: Pain - Acute:  Goal: Pain level will decrease  Description: Pain level will decrease  2020 by Celia eBrmudez RN  Outcome: Ongoing  2020 by Attila Gomez RN  Outcome: Ongoing  2020 by Eze Peres RN  Outcome: Ongoing  Goal: Able to cope with pain  Description: Able to cope with pain  9/25/2020 0604 by Scot Allen RN  Outcome: Ongoing  9/25/2020 0549 by Hallie Tan RN  Outcome: Ongoing  9/24/2020 2346 by Rob Khan RN  Outcome: Ongoing     Problem: Tissue Perfusion - Uteroplacental, Altered:  Goal: Absence of abnormal fetal heart rate pattern  Description: Absence of abnormal fetal heart rate pattern  9/25/2020 0604 by Scot Allen RN  Outcome: Ongoing  9/25/2020 0549 by Hallie Tan RN  Outcome: Ongoing  9/24/2020 2346 by Rob Khan RN  Outcome: Ongoing     Problem: Urinary Retention:  Goal: Experiences of bladder distention will decrease  Description: Experiences of bladder distention will decrease  9/25/2020 0604 by Scot Allen RN  Outcome: Ongoing  9/25/2020 0549 by Hallie Tan RN  Outcome: Ongoing  9/24/2020 2346 by Rob Khan RN  Outcome: Ongoing  Goal: Urinary elimination within specified parameters  Description: Urinary elimination within specified parameters  9/25/2020 0604 by Scot Allen RN  Outcome: Ongoing  9/25/2020 0549 by Hallie Tan RN  Outcome: Ongoing  9/24/2020 2346 by Rob Khan RN  Outcome: Ongoing

## 2020-09-25 NOTE — H&P
Department of Obstetrics and Gynecology  Labor and Delivery   Attending Progress Note      SUBJECTIVE:  Patient with mild preeclampsia without severe features scheduled for IOL on  at 37 wks EGA now with HA unrelieved with Tylenol, visual disturbances and N/V. Denies RUQ pain    OBJECTIVE:    BP (!) 157/88   Pulse 94   Temp 98.2 °F (36.8 °C) (Oral)   Resp 16   Ht 5' 5\" (1.651 m)   Wt 300 lb (136.1 kg) Comment: office visit 20  LMP  (Exact Date)   BMI 49.92 kg/m²   RRR CTA B  Soft, nontender, gravid  No RUQ tenderness  BL LE DTR + 1, no clonus    Fetal heart rate:       Baseline Heart Rate:  130        Accelerations:  present       Long Term Variability:  moderate       Decelerations:  absent         Contraction frequency: 0 minutes    Membranes:  Intact    Cervix:         Dilation:  Closed         Effacement:  50%         Station:  -3         Position:  Posterior    PIH labs wnl    PrCr ratio 0.3             ASSESSMENT & PLAN:    21 y.o.    OB History        4    Para   1    Term   1            AB   2    Living   1       SAB   2    TAB        Ectopic        Molar        Multiple        Live Births   1             36w3d  1. FWB: reassuring  2. Preeclampsia with severe features based on HA, Blurred vision, N/V.    3.  Admit for IOL. ACOG reviewed, A + , GBS unknown. BMI: 50; ffDNA: wnl, XY.

## 2020-09-25 NOTE — PROGRESS NOTES
Cervidil removed at this time. SVE performed. Pt is FT/50%/-3. Plan is for pt to shower and eat a light breakfast and then place a dose of cytotec.

## 2020-09-25 NOTE — PROGRESS NOTES
Chart reviewed and pt interviewed. Cervidil still in place. Pt is having some mild contractions. FHR tracing reassuring. Plan of care reviewed. If no cervical change after cervidil, will proceed with misoprostol. If cervical change, begin Pitocin. Epidural prn. PCN G in active labor for unknown GBS status <37 weeks.     Rocky Lopez MD

## 2020-09-25 NOTE — PROGRESS NOTES
Dr. Linda Priest updated that SVE 4 hours after cytotec #1 is 1/60%/-2. Received order to place cytotec #2 and start penicillin treatment for unknown GBS.

## 2020-09-25 NOTE — PROGRESS NOTES
Report received from [facility]. Bedside report given. Introduced myself to pt as her RN for the day. I put my name and phone number on the white board and showed pt how to use her room phone to get a hold of me. Pt was given her plan of care for the day. Call light within reach. Bed in lowest position and wheels are locked. Pt verbalized understanding and denies any further needs at this time. Continue to monitor.

## 2020-09-25 NOTE — PLAN OF CARE
Problem: Pain:  Goal: Pain level will decrease  Description: Pain level will decrease  9/25/2020 0549 by Attila Gomez RN  Outcome: Ongoing  9/24/2020 2346 by Naty Recinos RN  Outcome: Ongoing  Goal: Control of acute pain  Description: Control of acute pain  9/25/2020 0549 by Attila Gomez RN  Outcome: Ongoing  9/24/2020 2346 by Naty Recinos RN  Outcome: Ongoing  Goal: Control of chronic pain  Description: Control of chronic pain  9/25/2020 0549 by Attila Gomez RN  Outcome: Ongoing  9/24/2020 2346 by Naty Recinos RN  Outcome: Ongoing     Problem: Anxiety:  Goal: Level of anxiety will decrease  Description: Level of anxiety will decrease  9/25/2020 0549 by Attila Gomez RN  Outcome: Ongoing  9/24/2020 2346 by Naty Recinos RN  Outcome: Ongoing     Problem: Breathing Pattern - Ineffective:  Goal: Able to breathe comfortably  Description: Able to breathe comfortably  9/25/2020 0549 by Attila Gomez RN  Outcome: Ongoing  9/24/2020 2346 by Naty Recinos RN  Outcome: Ongoing     Problem: Fluid Volume - Imbalance:  Goal: Absence of imbalanced fluid volume signs and symptoms  Description: Absence of imbalanced fluid volume signs and symptoms  9/25/2020 0549 by Attila Gomez RN  Outcome: Ongoing  9/24/2020 2346 by Naty Recinos RN  Outcome: Ongoing  Goal: Absence of intrapartum hemorrhage signs and symptoms  Description: Absence of intrapartum hemorrhage signs and symptoms  9/25/2020 0549 by Attila Gomez RN  Outcome: Ongoing  9/24/2020 2346 by Naty Recinos RN  Outcome: Ongoing     Problem: Infection - Intrapartum Infection:  Goal: Will show no infection signs and symptoms  Description: Will show no infection signs and symptoms  9/25/2020 0549 by Attila Gomez RN  Outcome: Ongoing  9/24/2020 2346 by Naty Recinos RN  Outcome: Ongoing     Problem: Labor Process - Prolonged:  Goal: Labor progression, first stage, within specified pattern  Description: Labor progression, first stage, within specified pattern  202049 by Antonio Peraza RN  Outcome: Ongoing  2020 by Elena Newman RN  Outcome: Ongoing  Goal: Labor progession, second stage, within specified pattern  Description: Labor progession, second stage, within specified pattern  202049 by Antonio Peraza RN  Outcome: Ongoing  2020 by Elena Newman RN  Outcome: Ongoing  Goal: Uterine contractions within specified parameters  Description: Uterine contractions within specified parameters  202049 by Antonio Peraza RN  Outcome: Ongoing  2020 by Elena Newman RN  Outcome: Ongoing     Problem:  Screening:  Goal: Ability to make informed decisions regarding treatment has improved  Description: Ability to make informed decisions regarding treatment has improved  202049 by Antonio Peraza RN  Outcome: Ongoing  2020 by Elena Newman RN  Outcome: Ongoing     Problem: Pain - Acute:  Goal: Pain level will decrease  Description: Pain level will decrease  202049 by Antonio Peraza RN  Outcome: Ongoing  2020 by Elena Newman RN  Outcome: Ongoing  Goal: Able to cope with pain  Description: Able to cope with pain  2020 by Antonio Peraza RN  Outcome: Ongoing  2020 by Elena Newman RN  Outcome: Ongoing     Problem: Tissue Perfusion - Uteroplacental, Altered:  Goal: Absence of abnormal fetal heart rate pattern  Description: Absence of abnormal fetal heart rate pattern  2020 by Antonio Peraza RN  Outcome: Ongoing  2020 by Elena Newman RN  Outcome: Ongoing     Problem: Urinary Retention:  Goal: Experiences of bladder distention will decrease  Description: Experiences of bladder distention will decrease  202049 by Antonio Peraza RN  Outcome: Ongoing  2020 by Elena Newman RN  Outcome: Ongoing  Goal: Urinary elimination within specified parameters  Description: Urinary elimination within

## 2020-09-25 NOTE — PROGRESS NOTES
Dr. Gordon Stauffer at bedside for evaluation and SVE. MD wants pt to stay and be induced due to mild preeclampsia with severe features. Pt agreeable. SVE fingertip/50%.

## 2020-09-25 NOTE — PLAN OF CARE
Problem: Pain:  Goal: Pain level will decrease  Description: Pain level will decrease  9/25/2020 1921 by Steven Galvez RN  Outcome: Ongoing  9/25/2020 0604 by Celia Bermudez RN  Outcome: Ongoing  9/25/2020 0549 by Attila Gomez RN  Outcome: Ongoing  Goal: Control of acute pain  Description: Control of acute pain  9/25/2020 1921 by Steven Galvez RN  Outcome: Ongoing  9/25/2020 0604 by Celia Bermudez RN  Outcome: Ongoing  9/25/2020 0549 by Attila Gomez RN  Outcome: Ongoing  Goal: Control of chronic pain  Description: Control of chronic pain  9/25/2020 1921 by Steven Galvez RN  Outcome: Ongoing  9/25/2020 0604 by Celia Bermudez RN  Outcome: Ongoing  9/25/2020 0549 by Attila Gomez RN  Outcome: Ongoing     Problem: Anxiety:  Goal: Level of anxiety will decrease  Description: Level of anxiety will decrease  9/25/2020 1921 by Steven Galvez RN  Outcome: Ongoing  9/25/2020 0604 by Celia Bermudez RN  Outcome: Ongoing  9/25/2020 0549 by Attila Gomez RN  Outcome: Ongoing     Problem: Breathing Pattern - Ineffective:  Goal: Able to breathe comfortably  Description: Able to breathe comfortably  9/25/2020 1921 by Steven Galvez RN  Outcome: Ongoing  9/25/2020 0604 by Celia Bermudez RN  Outcome: Ongoing  9/25/2020 0549 by Attila Gomez RN  Outcome: Ongoing     Problem: Fluid Volume - Imbalance:  Goal: Absence of imbalanced fluid volume signs and symptoms  Description: Absence of imbalanced fluid volume signs and symptoms  9/25/2020 1921 by Steven Galvez RN  Outcome: Ongoing  9/25/2020 0604 by Celia Bermudez RN  Outcome: Ongoing  9/25/2020 0549 by Attila Gomez RN  Outcome: Ongoing  Goal: Absence of intrapartum hemorrhage signs and symptoms  Description: Absence of intrapartum hemorrhage signs and symptoms  9/25/2020 1921 by Steven Galvez RN  Outcome: Ongoing  9/25/2020 0604 by Celia Bermudez RN  Outcome: Ongoing  9/25/2020 0549 by Attila Gomez RN  Outcome: Ongoing     Problem: Infection - Intrapartum Infection:  Goal: Will show no infection signs and symptoms  Description: Will show no infection signs and symptoms  2020 by Yuki Townsend RN  Outcome: Ongoing  2020 0604 by Yari Durán RN  Outcome: Ongoing  2020 0549 by Levon Clark RN  Outcome: Ongoing     Problem: Labor Process - Prolonged:  Goal: Labor progression, first stage, within specified pattern  Description: Labor progression, first stage, within specified pattern  2020 by Yuki Townsend RN  Outcome: Ongoing  2020 0604 by Yari Durán RN  Outcome: Ongoing  2020 0549 by Levon Clark RN  Outcome: Ongoing  Goal: Labor progession, second stage, within specified pattern  Description: Labor progession, second stage, within specified pattern  2020 by Yuki Townsend RN  Outcome: Ongoing  2020 0604 by Yari Durán RN  Outcome: Ongoing  2020 0549 by Levon Clark RN  Outcome: Ongoing  Goal: Uterine contractions within specified parameters  Description: Uterine contractions within specified parameters  2020 by Yuki Townsend RN  Outcome: Ongoing  2020 0604 by Yari Durán RN  Outcome: Ongoing  2020 0549 by Levon Clark RN  Outcome: Ongoing     Problem:  Screening:  Goal: Ability to make informed decisions regarding treatment has improved  Description: Ability to make informed decisions regarding treatment has improved  2020 by Yuki Townsend RN  Outcome: Ongoing  2020 0604 by Yari Durán RN  Outcome: Ongoing  2020 0549 by Levon Clark RN  Outcome: Ongoing     Problem: Pain - Acute:  Goal: Pain level will decrease  Description: Pain level will decrease  2020 by Yuki Townsend RN  Outcome: Ongoing  2020 06 by Yari Durán RN  Outcome: Ongoing  2020 0549 by Levon Clark RN  Outcome: Ongoing  Goal: Able to cope with pain  Description: Able to cope with pain  9/25/2020 1921 by Ronnell Remy RN  Outcome: Ongoing  9/25/2020 0604 by Prem Muse RN  Outcome: Ongoing  9/25/2020 0549 by Blanca Cooley RN  Outcome: Ongoing     Problem: Tissue Perfusion - Uteroplacental, Altered:  Goal: Absence of abnormal fetal heart rate pattern  Description: Absence of abnormal fetal heart rate pattern  9/25/2020 1921 by Ronnell Remy RN  Outcome: Ongoing  9/25/2020 0604 by Prem Muse RN  Outcome: Ongoing  9/25/2020 0549 by Blanca Cooley RN  Outcome: Ongoing     Problem: Urinary Retention:  Goal: Experiences of bladder distention will decrease  Description: Experiences of bladder distention will decrease  9/25/2020 1921 by Ronnell Remy RN  Outcome: Ongoing  9/25/2020 0604 by Prem Muse RN  Outcome: Ongoing  9/25/2020 0549 by Blanca Cooley RN  Outcome: Ongoing  Goal: Urinary elimination within specified parameters  Description: Urinary elimination within specified parameters  9/25/2020 1921 by Ronnell Remy RN  Outcome: Ongoing  9/25/2020 0604 by Prem Muse RN  Outcome: Ongoing  9/25/2020 0549 by Blanca Cooley RN  Outcome: Ongoing

## 2020-09-26 ENCOUNTER — ANESTHESIA (OUTPATIENT)
Dept: LABOR AND DELIVERY | Age: 25
DRG: 560 | End: 2020-09-26
Payer: COMMERCIAL

## 2020-09-26 ENCOUNTER — ANESTHESIA EVENT (OUTPATIENT)
Dept: LABOR AND DELIVERY | Age: 25
DRG: 560 | End: 2020-09-26
Payer: COMMERCIAL

## 2020-09-26 PROCEDURE — 7200000001 HC VAGINAL DELIVERY

## 2020-09-26 PROCEDURE — 51701 INSERT BLADDER CATHETER: CPT

## 2020-09-26 PROCEDURE — 2580000003 HC RX 258: Performed by: OBSTETRICS & GYNECOLOGY

## 2020-09-26 PROCEDURE — 1220000000 HC SEMI PRIVATE OB R&B

## 2020-09-26 PROCEDURE — 3700000025 EPIDURAL BLOCK: Performed by: ANESTHESIOLOGY

## 2020-09-26 PROCEDURE — 6360000002 HC RX W HCPCS: Performed by: OBSTETRICS & GYNECOLOGY

## 2020-09-26 PROCEDURE — 2500000003 HC RX 250 WO HCPCS: Performed by: NURSE ANESTHETIST, CERTIFIED REGISTERED

## 2020-09-26 PROCEDURE — 6370000000 HC RX 637 (ALT 250 FOR IP): Performed by: OBSTETRICS & GYNECOLOGY

## 2020-09-26 RX ORDER — IBUPROFEN 800 MG/1
800 TABLET ORAL EVERY 6 HOURS PRN
Status: DISCONTINUED | OUTPATIENT
Start: 2020-09-26 | End: 2020-09-28 | Stop reason: HOSPADM

## 2020-09-26 RX ORDER — ACETAMINOPHEN 325 MG/1
650 TABLET ORAL EVERY 4 HOURS PRN
Status: DISCONTINUED | OUTPATIENT
Start: 2020-09-26 | End: 2020-09-28 | Stop reason: HOSPADM

## 2020-09-26 RX ORDER — SODIUM CHLORIDE 0.9 % (FLUSH) 0.9 %
10 SYRINGE (ML) INJECTION PRN
Status: DISCONTINUED | OUTPATIENT
Start: 2020-09-26 | End: 2020-09-28 | Stop reason: HOSPADM

## 2020-09-26 RX ORDER — ONDANSETRON 2 MG/ML
4 INJECTION INTRAMUSCULAR; INTRAVENOUS EVERY 6 HOURS PRN
Status: DISCONTINUED | OUTPATIENT
Start: 2020-09-26 | End: 2020-09-28 | Stop reason: HOSPADM

## 2020-09-26 RX ORDER — SODIUM CHLORIDE, SODIUM LACTATE, POTASSIUM CHLORIDE, CALCIUM CHLORIDE 600; 310; 30; 20 MG/100ML; MG/100ML; MG/100ML; MG/100ML
INJECTION, SOLUTION INTRAVENOUS CONTINUOUS
Status: DISCONTINUED | OUTPATIENT
Start: 2020-09-26 | End: 2020-09-28 | Stop reason: HOSPADM

## 2020-09-26 RX ORDER — SIMETHICONE 80 MG
80 TABLET,CHEWABLE ORAL EVERY 6 HOURS PRN
Status: DISCONTINUED | OUTPATIENT
Start: 2020-09-26 | End: 2020-09-28 | Stop reason: HOSPADM

## 2020-09-26 RX ORDER — DOCUSATE SODIUM 100 MG/1
100 CAPSULE, LIQUID FILLED ORAL 2 TIMES DAILY PRN
Status: DISCONTINUED | OUTPATIENT
Start: 2020-09-26 | End: 2020-09-28 | Stop reason: HOSPADM

## 2020-09-26 RX ORDER — BUPIVACAINE HYDROCHLORIDE 2.5 MG/ML
INJECTION, SOLUTION EPIDURAL; INFILTRATION; INTRACAUDAL PRN
Status: DISCONTINUED | OUTPATIENT
Start: 2020-09-26 | End: 2020-09-26 | Stop reason: SDUPTHER

## 2020-09-26 RX ORDER — FERROUS SULFATE 325(65) MG
325 TABLET ORAL 2 TIMES DAILY WITH MEALS
Status: DISCONTINUED | OUTPATIENT
Start: 2020-09-26 | End: 2020-09-28 | Stop reason: HOSPADM

## 2020-09-26 RX ORDER — SODIUM CHLORIDE 0.9 % (FLUSH) 0.9 %
10 SYRINGE (ML) INJECTION EVERY 12 HOURS SCHEDULED
Status: DISCONTINUED | OUTPATIENT
Start: 2020-09-26 | End: 2020-09-28 | Stop reason: HOSPADM

## 2020-09-26 RX ORDER — LANOLIN 100 %
OINTMENT (GRAM) TOPICAL PRN
Status: DISCONTINUED | OUTPATIENT
Start: 2020-09-26 | End: 2020-09-28 | Stop reason: HOSPADM

## 2020-09-26 RX ADMIN — Medication 2.5 MILLION UNITS: at 08:52

## 2020-09-26 RX ADMIN — Medication 15 ML/HR: at 11:26

## 2020-09-26 RX ADMIN — Medication 2.5 MILLION UNITS: at 05:02

## 2020-09-26 RX ADMIN — Medication 2.5 MILLION UNITS: at 13:42

## 2020-09-26 RX ADMIN — Medication 1 MILLI-UNITS/MIN: at 06:00

## 2020-09-26 RX ADMIN — IBUPROFEN 800 MG: 800 TABLET, FILM COATED ORAL at 20:40

## 2020-09-26 RX ADMIN — BENZOCAINE AND LEVOMENTHOL: 200; 5 SPRAY TOPICAL at 20:40

## 2020-09-26 RX ADMIN — SODIUM CHLORIDE, POTASSIUM CHLORIDE, SODIUM LACTATE AND CALCIUM CHLORIDE: 600; 310; 30; 20 INJECTION, SOLUTION INTRAVENOUS at 06:19

## 2020-09-26 RX ADMIN — DOCUSATE SODIUM 100 MG: 100 CAPSULE, LIQUID FILLED ORAL at 20:40

## 2020-09-26 RX ADMIN — WITCH HAZEL 40 EACH: 500 SOLUTION RECTAL; TOPICAL at 20:40

## 2020-09-26 RX ADMIN — BUPIVACAINE HYDROCHLORIDE 5 ML: 2.5 INJECTION, SOLUTION EPIDURAL; INFILTRATION; INTRACAUDAL; PERINEURAL at 11:21

## 2020-09-26 RX ADMIN — SODIUM CHLORIDE, POTASSIUM CHLORIDE, SODIUM LACTATE AND CALCIUM CHLORIDE: 600; 310; 30; 20 INJECTION, SOLUTION INTRAVENOUS at 11:35

## 2020-09-26 RX ADMIN — Medication 2.5 MILLION UNITS: at 00:49

## 2020-09-26 ASSESSMENT — PAIN SCALES - GENERAL: PAINLEVEL_OUTOF10: 1

## 2020-09-26 NOTE — ANESTHESIA PRE PROCEDURE
(PITOCIN) 30 units in 500 mL infusion  1 ronda-units/min Intravenous Continuous PRN Audie Nielsen MD        penicillin G potassium in d5w IVPB 2.5 Million Units  2.5 Million Units Intravenous Q4H Audie Nielsen  mL/hr at 09/26/20 0852 2.5 Million Units at 09/26/20 0852    lidocaine PF 1 % injection 2 mL  2 mL Intradermal Once Audie Nielsen MD         Facility-Administered Medications Ordered in Other Encounters   Medication Dose Route Frequency Provider Last Rate Last Dose    bupivacaine (PF) (MARCAINE) 0.25 % injection    PRN Sameul Ort, APRN - CRNA   5 mL at 09/26/20 1121    sodium chloride 0.9 % 200 mL with fentaNYL 500 mcg, bupivacaine 0.5% 50 mL (OB) epidural    Continuous PRN Sameul Ort, APRN - CRNA 15 mL/hr at 09/26/20 1126 15 mL/hr at 09/26/20 1126       Allergies:     Allergies   Allergen Reactions    Latex Rash    Lactose Intolerance (Gi) Diarrhea       Problem List:    Patient Active Problem List   Diagnosis Code    Intractable headache R51    Prenatal care, subsequent pregnancy in third trimester Z34.83    Hypertension affecting pregnancy in third trimester O16.3    Gestational hypertension, third trimester O13.3    Pre-eclampsia in third trimester O14.93       Past Medical History:        Diagnosis Date    Chlamydia 2017    received treatment    Heart abnormality     heart murmur     Hypertension affecting pregnancy in third trimester 9/11/2020    Impetigo     Intractable headache     Mental disorder 2008    depression       Past Surgical History:        Procedure Laterality Date    DILATION AND CURETTAGE OF UTERUS      DILATION AND CURETTAGE OF UTERUS  2012       Social History:    Social History     Tobacco Use    Smoking status: Never Smoker    Smokeless tobacco: Never Used   Substance Use Topics    Alcohol use: Not Currently     Comment: occasionally                                Counseling given: Not Answered      Vital Signs (Current):   Vitals:    09/26/20 0704 09/26/20 0830 09/26/20 1004 09/26/20 1124   BP: (!) 156/89 129/75 (!) 139/92 137/73   Pulse: 96 93 86 97   Resp:  16 16    Temp:  36.8 °C (98.2 °F) 36.6 °C (97.8 °F)    TempSrc:  Oral Oral    Weight:       Height:                                                  BP Readings from Last 3 Encounters:   09/26/20 137/73   09/23/20 (!) 145/84   09/11/20 134/80       NPO Status: Time of last liquid consumption: 1630(mt dew)                        Time of last solid consumption: 1200(hamburger)                        Date of last liquid consumption: 09/24/20                        Date of last solid food consumption: 09/24/20    BMI:   Wt Readings from Last 3 Encounters:   09/24/20 300 lb (136.1 kg)   09/23/20 300 lb (136.1 kg)   09/11/20 270 lb (122.5 kg)     Body mass index is 49.92 kg/m². CBC:   Lab Results   Component Value Date    WBC 12.6 09/24/2020    RBC 4.12 09/24/2020    HGB 11.8 09/24/2020    HCT 35.2 09/24/2020    MCV 85.5 09/24/2020    RDW 14.1 09/24/2020     09/24/2020       CMP:   Lab Results   Component Value Date     09/24/2020    K 3.8 09/24/2020     09/24/2020    CO2 20 09/24/2020    BUN 8 09/24/2020    CREATININE <0.5 09/24/2020    GFRAA >60 09/24/2020    AGRATIO 1.3 09/24/2020    LABGLOM >60 09/24/2020    GLUCOSE 86 09/24/2020    PROT 6.3 09/24/2020    CALCIUM 10.1 09/24/2020    BILITOT 0.3 09/24/2020    ALKPHOS 126 09/24/2020    AST 11 09/24/2020    ALT 8 09/24/2020       POC Tests: No results for input(s): POCGLU, POCNA, POCK, POCCL, POCBUN, POCHEMO, POCHCT in the last 72 hours.     Coags:   Lab Results   Component Value Date    PROTIME 10.5 09/24/2020    INR 0.91 09/24/2020    APTT 26.1 09/24/2020       HCG (If Applicable):   Lab Results   Component Value Date    PREGTESTUR Negative 04/02/2016        ABGs: No results found for: PHART, PO2ART, WVN0TRS, ITS2YFR, BEART, B6LDCDGC     Type & Screen (If Applicable):  Lab Results   Component Value Date    LABABO A 07/16/2012    [address] Positive 07/16/2012       Drug/Infectious Status (If Applicable):  No results found for: HIV, HEPCAB    COVID-19 Screening (If Applicable):   Lab Results   Component Value Date    COVID19 Not Detected 09/24/2020         Anesthesia Evaluation   no history of anesthetic complications:   Airway: Mallampati: III  TM distance: >3 FB   Neck ROM: full  Mouth opening: > = 3 FB Dental: normal exam         Pulmonary:Negative Pulmonary ROS and normal exam                               Cardiovascular:    (+) hypertension (Gestational):,                   Neuro/Psych:   (+) psychiatric history (Depression):            GI/Hepatic/Renal: Neg GI/Hepatic/Renal ROS            Endo/Other: Negative Endo/Other ROS                    Abdominal:   (+) obese,         Vascular: negative vascular ROS. Anesthesia Plan      epidural     ASA 3 - emergent             Anesthetic plan and risks discussed with patient and spouse. Plan discussed with attending.                   RUSS Schulte - CRNA   9/26/2020

## 2020-09-26 NOTE — PROGRESS NOTES
Department of Obstetrics and Gynecology  Labor and Delivery   Attending Progress Note    Pt met and chart reviewed.     SUBJECTIVE:  Increased contractions, continued leakage of fluid    OBJECTIVE:      Fetal heart rate:       Baseline Heart Rate:  140        Accelerations:  present       Long Term Variability:  moderate       Decelerations:  absent         Contraction frequency: 6 minutes    Membranes:  Ruptured clear fluid    Cervix:         Dilation:  4 cm         Effacement:  75%         Station:  -2         Position:  mid             ASSESSMENT & PLAN:    Continue pitocin augmentation

## 2020-09-26 NOTE — OP NOTE
Department of Obstetrics and Gynecology  Spontaneous Vaginal Delivery Note    Labor & Delivery Summary  Labor Onset Date: 20  Labor Onset Time:     Pre-operative Diagnosis:gestational hypertension, contractions    Post-operative Diagnosis:  Living  infant(s) and Male    Procedure:  Spontaneous vaginal delivery    Surgeon:    Moises Roberts for the patient's : Yumiko Duyen Bowen [[id number]]        APGAR'  8/9  Anesthesia:  epidural anesthesia    Estimated blood loss:  300ml    Specimen:  Placenta not sent to pathology     Cord blood sent No    Complications:  none    Condition:  infant stable and mother stable    Details of Procedure: The patient is a 21 y.o. female at 44w9d   OB History        4    Para   1    Term   1            AB   2    Living   1       SAB   2    TAB        Ectopic        Molar        Multiple        Live Births   1             who was admitted for induction. She received the following interventions: cervidil/cytotec/pitocin She was known to be GBS unknownand did receive antibiotic prophylaxis. The patient progressed well,did receive an epidural, became complete and started to push. After pushing for 1 min the fetal head was at the perineum, nose and mouth suctioned with bulb suction and the rest of the infant delivered atraumatically, placed on mother abdomen. Cord was clamped and cut and infant handed off to the waiting nurse for evaluation. The delivery of the placenta was spontaneous. The perineum and vagina were explored and there were not lacerations.               Electronically signed by Rod Gaxiola MD on 2020 at 3:43 PM

## 2020-09-26 NOTE — PROGRESS NOTES
Pt c/o of back discomfort. Repositioned to rocking chair from [id number]. RN adjusting ultrasound attempting to get fix on FHR tracing during this time.

## 2020-09-26 NOTE — PROGRESS NOTES
Department of Obstetrics and Gynecology  Labor and Delivery   Attending Progress Note      SUBJECTIVE:  Pt comfortable with epidural    OBJECTIVE:      Fetal heart rate:       Baseline Heart Rate:  140        Accelerations:  present       Long Term Variability:  moderate       Decelerations:  absent         Contraction frequency: 5 minutes    Membranes:  Ruptured clear fluid    Cervix:         Dilation:  5 cm         Effacement:  75%         Station:  -1         Position:  anterior             ASSESSMENT & PLAN:    Continue pitocin augmentation

## 2020-09-26 NOTE — ANESTHESIA PROCEDURE NOTES
Epidural Block    Patient location during procedure: OB  Start time: 9/26/2020 11:20 AM  End time: 9/26/2020 11:26 AM  Reason for block: labor epidural  Staffing  Resident/CRNA: RUSS Whatley CRNA  Performed: resident/CRNA   Preanesthetic Checklist  Completed: patient identified, site marked, surgical consent, pre-op evaluation, timeout performed, IV checked, risks and benefits discussed, monitors and equipment checked, anesthesia consent given, oxygen available and patient being monitored  Epidural  Patient position: sitting  Prep: ChloraPrep  Patient monitoring: continuous pulse ox and frequent blood pressure checks  Approach: midline  Location: lumbar (1-5) (L4-5)  Injection technique: SALMA saline  Provider prep: mask and sterile gloves  Needle  Needle type: Tuohy   Needle gauge: 17 G  Needle length: 3.5 in  Catheter type: side hole  Catheter size: 19 G (20 G)  Catheter at skin depth: 12 cm  Test dose: negative  Assessment  Sensory level: T8  Hemodynamics: stable  Attempts: 1  Additional Notes  Sitting, Sterile prep/drape, 1%Xylo at L4-5, 17ga Tuohy with SALMA, 25ga Pencan for w/+CSF for DPE, Pencan removed, Catheter inserted, negative test dose, sterile dressing applied.

## 2020-09-26 NOTE — PROGRESS NOTES
Report received from MITCHELL Espinosa RN. Bedside report given. Introduced myself to pt as her RN for the night. I put my name and phone number on the white board and showed pt how to use her room phone to get a hold of me. Pt was given her plan of care for the night. Call light within reach. Bed in lowest position and wheels are locked. Pt verbalized understanding and denies any further needs at this time. Continue to monitor.

## 2020-09-26 NOTE — PROGRESS NOTES
Pt feeling contractions more  Pitocin stopped then re-started overnight  FHR tracing reassuring  BPs remain in 120-150/60-90 range  Cvx 3/70/-2  AROM for clear fluid    PLAN:  Continue Pitocin, PCN G. Epidural prn    Basia Chew MD

## 2020-09-26 NOTE — PLAN OF CARE
Problem: Pain:  Description: Pain management should include both nonpharmacologic and pharmacologic interventions.   Goal: Pain level will decrease  Description: Pain level will decrease  9/25/2020 1921 by iRco Landaverde RN  Outcome: Ongoing  Goal: Control of acute pain  Description: Control of acute pain  9/25/2020 1921 by Rico Landaverde RN  Outcome: Ongoing  Goal: Control of chronic pain  Description: Control of chronic pain  9/25/2020 1921 by Rico Landaverde RN  Outcome: Ongoing     Problem: Anxiety:  Goal: Level of anxiety will decrease  Description: Level of anxiety will decrease  9/26/2020 0716 by Olga Lidia Petersen RN  Outcome: Ongoing  9/25/2020 1921 by Rico Landaverde RN  Outcome: Ongoing     Problem: Breathing Pattern - Ineffective:  Goal: Able to breathe comfortably  Description: Able to breathe comfortably  9/26/2020 0716 by Olga Lidia Petersen RN  Outcome: Ongoing  9/25/2020 1921 by Rico Landaverde RN  Outcome: Ongoing     Problem: Fluid Volume - Imbalance:  Goal: Absence of imbalanced fluid volume signs and symptoms  Description: Absence of imbalanced fluid volume signs and symptoms  9/26/2020 0716 by Olga Lidia Petersen RN  Outcome: Ongoing  9/25/2020 1921 by Rico Landaverde RN  Outcome: Ongoing  Goal: Absence of intrapartum hemorrhage signs and symptoms  Description: Absence of intrapartum hemorrhage signs and symptoms  9/26/2020 0716 by Olga Lidia Petersen RN  Outcome: Ongoing  9/25/2020 1921 by Rico Landaverde RN  Outcome: Ongoing     Problem: Infection - Intrapartum Infection:  Goal: Will show no infection signs and symptoms  Description: Will show no infection signs and symptoms  9/26/2020 0716 by Olga Lidia Petersen RN  Outcome: Ongoing  9/25/2020 1921 by Rico Landaverde RN  Outcome: Ongoing     Problem: Labor Process - Prolonged:  Goal: Labor progression, first stage, within specified pattern  Description: Labor progression, first stage, within specified pattern  9/26/2020 0716 by Eileen Chopra Barby Marie RN  Outcome: Ongoing  2020 by Bishop Dolores RN  Outcome: Ongoing  Goal: Labor progession, second stage, within specified pattern  Description: Labor progession, second stage, within specified pattern  2020 by Franck Cardenas RN  Outcome: Ongoing  2020 by Bishop Dolores RN  Outcome: Ongoing  Goal: Uterine contractions within specified parameters  Description: Uterine contractions within specified parameters  2020 by Franck Cardenas RN  Outcome: Ongoing  2020 by Bishop Dolores RN  Outcome: Ongoing     Problem:  Screening:  Goal: Ability to make informed decisions regarding treatment has improved  Description: Ability to make informed decisions regarding treatment has improved  2020 by Franck Cardenas RN  Outcome: Ongoing  2020 by Bishop Dolores RN  Outcome: Ongoing     Problem: Pain - Acute:  Goal: Pain level will decrease  Description: Pain level will decrease  2020 by Bishop Dolores RN  Outcome: Ongoing  Goal: Able to cope with pain  Description: Able to cope with pain  2020 by Franck Cardenas RN  Outcome: Ongoing  2020 by Bishop Dolores RN  Outcome: Ongoing     Problem: Tissue Perfusion - Uteroplacental, Altered:  Description: [TRUNCATED] For intrapartum patients with recurrent variable decelerations of the fetal heart rate, consider transcervical amnioinfusion. For patients in labor, avoid prophylactic use of continuous maternal oxygen supplementation to prevent nonreassu . ..   Goal: Absence of abnormal fetal heart rate pattern  Description: Absence of abnormal fetal heart rate pattern  2020 by Franck Cardenas RN  Outcome: Ongoing  2020 by Bishop Dolores RN  Outcome: Ongoing     Problem: Urinary Retention:  Goal: Experiences of bladder distention will decrease  Description: Experiences of bladder distention will decrease  2020 by Franck Cardenas

## 2020-09-26 NOTE — PLAN OF CARE
Problem: Pain:  Description: Pain management should include both nonpharmacologic and pharmacologic interventions.   Goal: Pain level will decrease  Description: Pain level will decrease  Outcome: Completed  Goal: Control of acute pain  Description: Control of acute pain  Outcome: Completed  Goal: Control of chronic pain  Description: Control of chronic pain  Outcome: Completed     Problem: Anxiety:  Goal: Level of anxiety will decrease  Description: Level of anxiety will decrease  9/26/2020 1835 by Homar Vergara RN  Outcome: Completed  9/26/2020 0716 by Homar Vergara RN  Outcome: Ongoing     Problem: Breathing Pattern - Ineffective:  Goal: Able to breathe comfortably  Description: Able to breathe comfortably  9/26/2020 1835 by Homar Vergara RN  Outcome: Completed  9/26/2020 0716 by Homar Vergara RN  Outcome: Ongoing     Problem: Fluid Volume - Imbalance:  Goal: Absence of imbalanced fluid volume signs and symptoms  Description: Absence of imbalanced fluid volume signs and symptoms  9/26/2020 1835 by Homar Vergara RN  Outcome: Completed  9/26/2020 0716 by Homar Vergara RN  Outcome: Ongoing  Goal: Absence of intrapartum hemorrhage signs and symptoms  Description: Absence of intrapartum hemorrhage signs and symptoms  9/26/2020 1835 by Homar Vergara RN  Outcome: Completed  9/26/2020 0716 by Homar Vergara RN  Outcome: Ongoing     Problem: Infection - Intrapartum Infection:  Goal: Will show no infection signs and symptoms  Description: Will show no infection signs and symptoms  9/26/2020 1835 by Homar Vergara RN  Outcome: Completed  9/26/2020 0716 by Homar Vergara RN  Outcome: Ongoing     Problem: Labor Process - Prolonged:  Goal: Labor progression, first stage, within specified pattern  Description: Labor progression, first stage, within specified pattern  9/26/2020 1835 by Homar Vergara RN  Outcome: Completed  9/26/2020 0716 by Homar Vergara RN  Outcome: Ongoing  Goal: Labor progession, second stage, within specified pattern  Description: Labor progession, second stage, within specified pattern  2020 by Tommy Gaytan RN  Outcome: Completed  2020 by Tommy Gaytan RN  Outcome: Ongoing  Goal: Uterine contractions within specified parameters  Description: Uterine contractions within specified parameters  2020 by Tommy Gaytan RN  Outcome: Completed  2020 by Tommy Gaytan RN  Outcome: Ongoing     Problem:  Screening:  Goal: Ability to make informed decisions regarding treatment has improved  Description: Ability to make informed decisions regarding treatment has improved  2020 by Tommy Gaytan RN  Outcome: Completed  2020 by Tommy Gaytan RN  Outcome: Ongoing     Problem: Pain - Acute:  Goal: Pain level will decrease  Description: Pain level will decrease  Outcome: Completed  Goal: Able to cope with pain  Description: Able to cope with pain  2020 by Tommy Gaytan RN  Outcome: Completed  2020 by Tommy Gaytan RN  Outcome: Ongoing     Problem: Tissue Perfusion - Uteroplacental, Altered:  Description: [TRUNCATED] For intrapartum patients with recurrent variable decelerations of the fetal heart rate, consider transcervical amnioinfusion. For patients in labor, avoid prophylactic use of continuous maternal oxygen supplementation to prevent nonreassu . ..   Goal: Absence of abnormal fetal heart rate pattern  Description: Absence of abnormal fetal heart rate pattern  2020 by Tommy Gaytan RN  Outcome: Completed  2020 by Tommy Gaytan RN  Outcome: Ongoing     Problem: Urinary Retention:  Goal: Experiences of bladder distention will decrease  Description: Experiences of bladder distention will decrease  2020 by Tommy Gaytan RN  Outcome: Completed  2020 by Tommy Gaytan RN  Outcome: Ongoing  Goal: Urinary elimination within specified parameters  Description: Urinary elimination within specified parameters  9/26/2020 1835 by Nathalie Sloan RN  Outcome: Completed  9/26/2020 0716 by Nathalie Sloan RN  Outcome: Ongoing

## 2020-09-26 NOTE — PROGRESS NOTES
Pt up to chair. RN adjusting ultrasound monitors from [id number]. Difficulty obtaining FHR tracing due to maternal body habitus, loss of fix during this time.

## 2020-09-26 NOTE — PROGRESS NOTES
Pt starting to feel contractions more. S/P cervidil and cytotec x 2  FHR tracing reassuring  Ctxns q 2-5 mins, mild  Cvx 2-3/70/-2, posterior  BPs 130's/70-90's    PLAN:  Pitocin, PCN G, epidural prn.     Mallory Weller MD

## 2020-09-27 LAB
HCT VFR BLD CALC: 26.3 % (ref 36–48)
HEMOGLOBIN: 8.8 G/DL (ref 12–16)
MCH RBC QN AUTO: 28.7 PG (ref 26–34)
MCHC RBC AUTO-ENTMCNC: 33.3 G/DL (ref 31–36)
MCV RBC AUTO: 86 FL (ref 80–100)
PDW BLD-RTO: 14.4 % (ref 12.4–15.4)
PLATELET # BLD: 209 K/UL (ref 135–450)
PMV BLD AUTO: 9 FL (ref 5–10.5)
RBC # BLD: 3.06 M/UL (ref 4–5.2)
WBC # BLD: 11.8 K/UL (ref 4–11)

## 2020-09-27 PROCEDURE — 36415 COLL VENOUS BLD VENIPUNCTURE: CPT

## 2020-09-27 PROCEDURE — 85027 COMPLETE CBC AUTOMATED: CPT

## 2020-09-27 PROCEDURE — 6370000000 HC RX 637 (ALT 250 FOR IP): Performed by: OBSTETRICS & GYNECOLOGY

## 2020-09-27 PROCEDURE — 1220000000 HC SEMI PRIVATE OB R&B

## 2020-09-27 RX ADMIN — FERROUS SULFATE TAB 325 MG (65 MG ELEMENTAL FE) 325 MG: 325 (65 FE) TAB at 08:14

## 2020-09-27 RX ADMIN — IBUPROFEN 800 MG: 800 TABLET, FILM COATED ORAL at 02:33

## 2020-09-27 RX ADMIN — IBUPROFEN 800 MG: 800 TABLET, FILM COATED ORAL at 08:15

## 2020-09-27 RX ADMIN — DOCUSATE SODIUM 100 MG: 100 CAPSULE, LIQUID FILLED ORAL at 08:14

## 2020-09-27 RX ADMIN — DOCUSATE SODIUM 100 MG: 100 CAPSULE, LIQUID FILLED ORAL at 20:51

## 2020-09-27 RX ADMIN — IBUPROFEN 800 MG: 800 TABLET, FILM COATED ORAL at 20:55

## 2020-09-27 ASSESSMENT — PAIN SCALES - GENERAL
PAINLEVEL_OUTOF10: 2

## 2020-09-27 NOTE — PLAN OF CARE
Problem: Discharge Planning:  Goal: Discharged to appropriate level of care  Description: Discharged to appropriate level of care  9/27/2020 0824 by Shaun Aragon RN  Outcome: Ongoing  9/26/2020 1953 by Shun Crespo RN  Outcome: Ongoing  9/26/2020 1835 by Catie Callahan RN  Outcome: Ongoing     Problem: Constipation:  Goal: Bowel elimination is within specified parameters  Description: Bowel elimination is within specified parameters  9/26/2020 1953 by Shun Crespo RN  Outcome: Ongoing  9/26/2020 1835 by Catie Callahan RN  Outcome: Ongoing     Problem: Fluid Volume - Imbalance:  Goal: Absence of imbalanced fluid volume signs and symptoms  Description: Absence of imbalanced fluid volume signs and symptoms  9/27/2020 0824 by Shaun Aragon RN  Outcome: Ongoing  9/26/2020 1953 by Shun Crespo RN  Outcome: Ongoing  9/26/2020 1835 by Catie Callahan RN  Outcome: Ongoing  Goal: Absence of postpartum hemorrhage signs and symptoms  Description: Absence of postpartum hemorrhage signs and symptoms  9/27/2020 0824 by Shaun Aragon RN  Outcome: Ongoing  9/26/2020 1953 by Shun Crespo RN  Outcome: Ongoing  9/26/2020 1835 by Catie Callahan RN  Outcome: Ongoing     Problem: Infection - Risk of, Puerperal Infection:  Goal: Will show no infection signs and symptoms  Description: Will show no infection signs and symptoms  9/26/2020 1953 by Shun Crespo RN  Outcome: Ongoing  9/26/2020 1835 by Catie Callahan RN  Outcome: Ongoing     Problem: Mood - Altered:  Goal: Mood stable  Description: Mood stable  9/26/2020 1953 by Shun Crespo RN  Outcome: Ongoing  9/26/2020 1835 by Catie Callahan RN  Outcome: Ongoing     Problem: Pain - Acute:  Goal: Pain level will decrease  Description: Pain level will decrease  9/27/2020 0824 by Shaun Aragon RN  Outcome: Ongoing  9/26/2020 1953 by Shun Crespo RN  Outcome: Ongoing  9/26/2020 1835 by Catie Callahan RN  Outcome: Ongoing

## 2020-09-27 NOTE — PROGRESS NOTES
Pt assisted by 2 staff members to restroom for first time get up. Pt denies dizziness or lightheadedness. Gait steady. Pt voided 300 mL urine without difficulty. Pericare done by pt with RN instruction. New pad and panties put on pt. Pt declined ice pack at this time. Gown changed. Hand hygiene done. Complete linen change done and comfort pad put on bed. Pt tolerated well.

## 2020-09-27 NOTE — PROGRESS NOTES
Department of Obstetrics and Gynecology  Labor and Delivery  Attending Post Partum Progress Note      SUBJECTIVE:  Pt without complaints, lochia=menses, adequate pain control    OBJECTIVE:      Vitals:  /70   Pulse 93   Temp 98 °F (36.7 °C) (Oral)   Resp 18   Ht 5' 5\" (1.651 m)   Wt 300 lb (136.1 kg) Comment: office visit 20  LMP  (Exact Date)   Breastfeeding Unknown   BMI 49.92 kg/m²     ABDOMEN:  No scars, normal bowel sounds, soft, non-distended, non-tender, no masses palpated, no hepatosplenomegally  GENITAL/URINARY:  deferred    DATA:    CBC:    Lab Results   Component Value Date    WBC 11.8 2020    RBC 3.06 2020    HGB 8.8 2020    HCT 26.3 2020    MCV 86.0 2020    RDW 14.4 2020     2020       ASSESSMENT & PLAN:      A: PPD #1 s/p       Preeclampsia      Anemia secondary to blood loss    P: cont nl pp care,       Iron/sulfate       Monitor blood pressures

## 2020-09-27 NOTE — ANESTHESIA POSTPROCEDURE EVALUATION
Department of Anesthesiology  Postprocedure Note    Patient: Codi Patten  MRN: [id number]  YOB: 1995  Date of evaluation: 9/27/2020  Time:  3:15 PM     Procedure Summary     Date:  09/26/20 Room / Location:      Anesthesia Start:  1110 Anesthesia Stop:  6437    Procedure:  Labor Analgesia Diagnosis:      Scheduled Providers:   Responsible Provider:  Faraz Evans MD    Anesthesia Type:  epidural ASA Status:  3 - Emergent          Anesthesia Type: No value filed. Trinity Phase I: Trinity Score: 10    Trinity Phase II: Trinity Score: 10    Last vitals: Reviewed and per EMR flowsheets.        Anesthesia Post Evaluation    Patient location during evaluation: bedside  Patient participation: complete - patient participated  Level of consciousness: awake and alert  Nausea & Vomiting: no nausea and no vomiting  Complications: no  Cardiovascular status: hemodynamically stable  Hydration status: stable

## 2020-09-28 VITALS
DIASTOLIC BLOOD PRESSURE: 93 MMHG | SYSTOLIC BLOOD PRESSURE: 145 MMHG | HEART RATE: 90 BPM | RESPIRATION RATE: 16 BRPM | HEIGHT: 65 IN | WEIGHT: 293 LBS | TEMPERATURE: 97.9 F | BODY MASS INDEX: 48.82 KG/M2

## 2020-09-28 PROBLEM — O14.93 PRE-ECLAMPSIA IN THIRD TRIMESTER: Status: RESOLVED | Noted: 2020-09-24 | Resolved: 2020-09-28

## 2020-09-28 PROCEDURE — 6370000000 HC RX 637 (ALT 250 FOR IP): Performed by: OBSTETRICS & GYNECOLOGY

## 2020-09-28 RX ORDER — IBUPROFEN 800 MG/1
800 TABLET ORAL EVERY 6 HOURS PRN
Qty: 30 TABLET | Refills: 2 | Status: SHIPPED | OUTPATIENT
Start: 2020-09-28

## 2020-09-28 RX ORDER — FERROUS SULFATE 325(65) MG
325 TABLET ORAL 2 TIMES DAILY WITH MEALS
Qty: 30 TABLET | Refills: 3 | COMMUNITY
Start: 2020-09-28

## 2020-09-28 RX ADMIN — FERROUS SULFATE TAB 325 MG (65 MG ELEMENTAL FE) 325 MG: 325 (65 FE) TAB at 08:41

## 2020-09-28 RX ADMIN — IBUPROFEN 800 MG: 800 TABLET, FILM COATED ORAL at 08:42

## 2020-09-28 RX ADMIN — IBUPROFEN 800 MG: 800 TABLET, FILM COATED ORAL at 02:59

## 2020-09-28 ASSESSMENT — PAIN SCALES - GENERAL
PAINLEVEL_OUTOF10: 2
PAINLEVEL_OUTOF10: 0

## 2020-09-28 NOTE — DISCHARGE SUMMARY
Department of Obstetrics and Gynecology  Postpartum Discharge Summary      Admit Date: 2020    Admit Diagnosis: Pre-eclampsia in third trimester [O14.93]    Discharge Date: 20    Condition at Discharge: good    Discharge Diagnoses: spontaneous vaginal delivery    Discharge Disposition:  Home    Service: Obstetrics    Postpartum complications: none     Hospital Course: uncomplicated     Data:  Information for the patient's : Jordan Bowen [[id number]]        Weight   Information for the patient's : Jordan Bowen [[id number]]        Apgars   Information for the patient's : Jordan Bowen [[id number]]         Disposition of Baby:  Home with mother      Current Discharge Medication List      START taking these medications    Details   ibuprofen (ADVIL;MOTRIN) 800 MG tablet Take 1 tablet by mouth every 6 hours as needed (Pain level 1 - 10)  Qty: 30 tablet, Refills: 2    Associated Diagnoses: Vaginal delivery      ferrous sulfate (IRON 325) 325 (65 Fe) MG tablet Take 1 tablet by mouth 2 times daily (with meals)  Qty: 30 tablet, Refills: 3         CONTINUE these medications which have NOT CHANGED    Details   acetaminophen (TYLENOL) 500 MG tablet Take 1,000 mg by mouth every 6 hours as needed for Pain      calcium carbonate (TUMS) 500 MG chewable tablet Take 4 tablets by mouth as needed for Heartburn      Prenatal MV-Min-Fe Fum-FA-DHA (PRENATAL 1 PO) Take by mouth         STOP taking these medications       ondansetron (ZOFRAN) 8 MG tablet Comments:   Reason for Stopping:         Cholecalciferol (VITAMIN D3) 2000 units CAPS Comments:   Reason for Stopping:                Follow-up 6 weeks in office          Electronically signed by Lynnette Alonso MD on 2020 at 9:55 AM

## 2020-09-28 NOTE — FLOWSHEET NOTE
Discharge Phone Call Log  Patient Name: Alicia Thornton     [facility] Care Provider: Yasmany Hsieh MD Discharge Date: 2020    Disposition of baby:    Phone Number: [phone] (home)     Attempts to Contact:  Date:    Nurse  Date:    Nurse  Date:    Nurse    1. Now that you are at home is your pain being well controlled? Y/N   What pain reducing measures are you using? ____________________________________        Information for the patient's : Oskar Yang [[id number]]   Delivery Method: Vaginal, Spontaneous     2. Are you currently  having any infant feeding issues? Y/N _____________________________ If yes, please explain: __________________________________________________________________  3. If breastfeeding, were you satisfied with the breastfeeding support services offered? Y/N  4.  Have you had to supplement? Y/N If yes, please explain: _____________________________________________________  5. Did your OB provider offer you information about the benefits of breastfeeding during your prenatal visits? Y/N  6.  Have you made or have you already had your first appointment with the baby's doctor? Y/N If no, do you know when to schedule it? Y/N   7.  Have you scheduled your follow-up appointment? Y/N  If no, do you know when to schedule it? Y/N  8. Did staff discuss safe sleep during your stay? Y/N  Did you see the wall cling posted in your room explaining how to keep you and your baby safe? Y/N  9. Can you tell me at least 1 point you learned from reading or hearing about infant safety and safe sleep practices? 10. Did your nurses and physicians include you in the plan of care, communicating with you respectfully? Y/N If no, please explain __________________________  11. Is there anyone in particular you would like to mention who provided care for you? ________________________________  12. Did your discharge occur in a timely manner?   Y/N If no, please explain __________________________  13. Do you have any other questions or concerns I can address today?  Y/N  __________________________________________________      Teaching During interview :_____________________________________________  ___________________________RN       Date:______________Time:________________

## 2020-09-28 NOTE — FLOWSHEET NOTE
Infant care teaching completed and forms signed by patient. Copy witnessed by RN and given to patient. Parent/Care giver verbalized understanding of all teaching points. Infant care teaching completed and forms signed by mother of infant. Copy witnessed by RN and given to patient. Parent/Care giver verbalized understanding of all teaching points. Parent/Care giver plans to follow-up with Pediatrician as instructed. Parent/Care giver discharged via wheelchair with infant in arms secured by parents in personal car seat.

## 2020-09-28 NOTE — PROGRESS NOTES
Department of Obstetrics and Gynecology  Labor and Delivery  Attending Post Partum Progress Note      SUBJECTIVE:  Feels well. No probs. BP somewhat labile but pt is asymptomatic. Wants to go home after infant has circ. OBJECTIVE:      Vitals:  BP (!) 145/93   Pulse 90   Temp 97.9 °F (36.6 °C) (Oral)   Resp 16   Ht 5' 5\" (1.651 m)   Wt 300 lb (136.1 kg) Comment: office visit 20  LMP  (Exact Date)   Breastfeeding Unknown   BMI 49.92 kg/m²   BP's 130-140/70-90    ABDOMEN:  FFNT  GENITAL/URINARY:  deferred    DATA:    CBC:    Lab Results   Component Value Date    WBC 11.8 2020    RBC 3.06 2020    HGB 8.8 2020    HCT 26.3 2020    MCV 86.0 2020    RDW 14.4 2020     2020       ASSESSMENT & PLAN:      IMP:  PPD#2 S/P  after IOL for pre-eclampsia with mild features; asymptomatic anemia from expected blood loss at delivery  PLAN:  Home. Rx Motrin. F/U 6 weeks office. Instructed.     Rcoky Lopez MD

## 2020-10-06 NOTE — ADT AUTH CERT
Patient Demographics     Name  Patient ID  SSN  Gender Identity  Birth Date    Cato Bamberger  [id number]    Female  [birth year] (24 yrs)    Address  Phone  Email  Employer     [address] [place] [address] [address]  [phone] (E)   [phone] (M)  [email or web address]  NOT [address]  [place]  Occupation  Emp Status     [employer]  White  --  Not Employed     Reg Status  PCP  Date Last Verified  Next Review Date     Verified  Mila Uriarte 54  20     Admission Date  Discharge Date  Admitting Provider      20  Korina Chris MD      Marital Status  Sabianist       Single  None       Emergency Contact 1  Emergency Contact 2  Emergency Contact 3    Pipe Jerry [phone]) [phone] (H)  Per Patient None [facility])    Subscriber Details   Hospital Account [de-identified]   CVG  Subscriber Name/Sex/Relation  Subscriber   Subscriber Address/Phone  Subscriber Emp/Emp Phone    1.  Braden Medina   [id number]  [facility] - Female   (Self)  1995  [address]  [address] [phone](O)  [employer]    Utilization Reviews         Hypertensive Disorders of Pregnancy - Care Day 4 (2020) by Violette Patton RN         Review Entered  Review Status    2020 12:25  Completed        Criteria Review       Care Day: 4 Care Date: 2020 Level of Care:    Guideline Day 2    Level Of Care    (X) Obstetric floor to discharge    2020 12:25 PM EDT by Yesi Ferreira      OB floor    Clinical Status    (X) * Blood pressure normal or adequately controlled    2020 12:25 PM EDT by Yesi Ferreira      149/86, 158/73    (X) * No seizure activity identified    (X) * Laboratory values normal or improved    ( ) * Fetal status acceptable    ( ) * Delivery not indicated imminently    ( ) * Discharge plans and education understood    Activity    ( ) * Ambulatory or acceptable for next level of care    Routes    ( ) * Oral hydration, medications, and diet    Interventions    (X) Fetal monitoring, including daily fetal movements    * Milestone    Additional Notes    9/27  Day 4       Pt remains in OB unit       Vitals: t: 36.9 p: 121 rr: 18 bp: 136/92 spo2: 100% ra        Hypertensive Disorders of Pregnancy - Care Day 3 (9/26/2020) by Lauren Figueroa RN         Review Entered  Review Status    9/30/2020 12:25  Completed        Criteria Review       Care Day: 3 Care Date: 9/26/2020 Level of Care:    Guideline Day 2    Level Of Care    (X) Obstetric floor to discharge    9/30/2020 12:25 PM EDT by Ebony Rincon      OB floor    Clinical Status    ( ) * Blood pressure normal or adequately controlled    9/30/2020 12:25 PM EDT by Ebony Rincon      171/100, 151/94    (X) * No seizure activity identified    (X) * Laboratory values normal or improved    ( ) * Fetal status acceptable    ( ) * Delivery not indicated imminently    ( ) * Discharge plans and education understood    Activity    ( ) * Ambulatory or acceptable for next level of care    Routes    ( ) * Oral hydration, medications, and diet    Interventions    (X) Fetal monitoring, including daily fetal movements    * Milestone    Additional Notes    9/26  Day 3       Pt remains on OB unit       Vitals:  36.9 p: 110 rr: 16 bp: 171/100 spo2: 100% ra       Infant delivered vaginally today